# Patient Record
Sex: FEMALE | Race: BLACK OR AFRICAN AMERICAN
[De-identification: names, ages, dates, MRNs, and addresses within clinical notes are randomized per-mention and may not be internally consistent; named-entity substitution may affect disease eponyms.]

---

## 2017-03-08 NOTE — L&D FLOW SHEET
=================================================================

LD Flowsheet

=================================================================

Datetime Report Generated by CPN: 03/08/2017 22:00

   

   

=================================================================

Datetime: 03/08/2017 21:58

=================================================================

   

 NBP Sys/Dilma/Mean (mmHg):  139 (QS system process)

:  82 (QS system process)

:  105 (QS system process)

 Pulse:  82 (QS system process)

   

=================================================================

Datetime: 03/08/2017 21:43

=================================================================

   

 NBP Sys/Dilma/Mean (mmHg):  145 (QS system process)

:  71 (QS system process)

:  102 (QS system process)

 Pulse:  93 (QS system process)

   

=================================================================

Datetime: 03/08/2017 21:28

=================================================================

   

 NBP Sys/Dilma/Mean (mmHg):  141 (QS system process)

:  85 (QS system process)

:  107 (QS system process)

 Pulse:  80 (QS system process)

   

=================================================================

Datetime: 03/08/2017 21:14

=================================================================

   

Stage of Pregnancy:  Recovery (Betina Reece, RN)

   

=================================================================

Datetime: 03/08/2017 20:58

=================================================================

   

Stage of Pregnancy:  Recovery (Betina Reece, RN)

 NBP Sys/Dilma/Mean (mmHg):  131 (QS system process)

:  78 (QS system process)

:  102 (QS system process)

 Pulse:  87 (QS system process)

 Respirations:  18 (Betina Newell RN)

 Temperature (F):  98.7 (Betina Newell RN)

 Temperature (C):  37.1 (QS system process)

 Temperature Route:  Oral (Betina Newell RN)

 Pain Scale:  0 (Betina Newell RN)

   

=================================================================

Datetime: 03/08/2017 20:54

=================================================================

   

 Patient Care Comments:  del viable male (Betina Newell, YADIRA)

   

=================================================================

Datetime: 03/08/2017 20:50

=================================================================

   

 I/O Interventions:  Cot[place] Discontinued (Betina Newell RN)

 Patient Care Comments:  900 ml (Betina Newell RN)

   

=================================================================

Datetime: 03/08/2017 20:49

=================================================================

   

 Dilatation (cm):  10.0 (Betina Reece, RN)

   

=================================================================

Datetime: 03/08/2017 20:48

=================================================================

   

 NBP Sys/Dilma/Mean (mmHg):  138 (QS system process)

:  87 (QS system process)

:  105 (QS system process)

 Pulse:  96 (QS system process)

 LaborFlag:  Labor (QS system process)

   

=================================================================

Datetime: 03/08/2017 20:46

=================================================================

   

 Patient Position/Activity:  Tailors (Alba Lattibeaudeir, RN)

   

=================================================================

Datetime: 03/08/2017 20:45

=================================================================

   

 Monitor Mode:  External (Betina Newell RN)

 Frequency (min):  2-3 (Betina Newell RN)

 Quality:  Strong (Betina Newell RN)

 Duration (sec):  60-90 (Betina Newell RN)

 Pattern:  Normal: <= 5 Contractions in 10 Minutes (Betina Newell RN)

 Resting Tone (Palpate):  Relaxed (Betina Newell RN)

 Monitor Mode:  External US (Betina Newell RN)

 FHR Baseline Rate :  175 (Betina Newell RN)

 FHR Baseline Changes:  Tachycardia (Betina Newell RN)

 Variability:  Moderate 6-25 bpm (Betina Neewll RN)

 Accelerations:  None (Betina Newell RN)

 Decelerations:  Late (Betina Newell RN)

 Patient Care Comments:  Pt sitting up facing head of bed (Alba Gonzales RN)

 Communication:  RN Reviewed Strip; Provider at Bedside (Betina Newell RN)

   

=================================================================

Datetime: 03/08/2017 20:41

=================================================================

   

 Patient Care Comments:  pressure bag applied to LR (Alba Gonzales RN)

   

=================================================================

Datetime: 03/08/2017 20:36

=================================================================

   

 Actions for Fetal Decelerations:  Hands and Knees (Alba

   Lattibeaudeir, RN)

   

=================================================================

Datetime: 03/08/2017 20:34

=================================================================

   

 Contraction Comments:  Trial push to reduce cervix. (Betina Newell RN)

 IV/Blood Work:  IV Bolus Started (Betina Newell RN)

 Oxygen Amount :  10 (Alba Lattibeaudeir, RN)

 Oxygen Method:  Non-Rebreather (Alba Lattibeaudeir, RN)

   

=================================================================

Datetime: 03/08/2017 20:32

=================================================================

   

 Stage 2 Comments:  Dr. Wagner at . (Alba Lattibeaudeir, RN)

   

=================================================================

Datetime: 03/08/2017 20:30

=================================================================

   

 Monitor Mode:  External US (Betina Reece, RN)

 FHR Baseline Rate :  175 (Betina Reece, RN)

 FHR Baseline Changes:  Tachycardia (Betina Reece, RN)

 Variability:  Minimal - Undetectable to <=5 bpm (Betina Reece, RN)

 Accelerations:  15X15 (Betina Reece, RN)

 Decelerations:  Early; Variable (Betina Reece, RN)

 Comments:  variable down to 100's; 2 lates (Betina Reece, RN)

   

=================================================================

Datetime: 03/08/2017 20:23

=================================================================

   

 Dilatation (cm):  9.5 (Betina Reece, RN)

 Station:  1 (Betina Newell, RN)

 Exam by:  THAD Paulson (Betina Reece, RN)

 Vaginal Bleeding:  None (Betian Newell, RN)

 Cervix, Consistency:  Soft (Betina Reece, RN)

 Cervix, Position:  Anterior (Betina Reece, RN)

   

=================================================================

Datetime: 03/08/2017 20:20

=================================================================

   

 Patient Care Comments:  O2 d/c'd (Betina Newell, RN)

   

=================================================================

Datetime: 03/08/2017 20:18

=================================================================

   

 NBP Sys/Dilma/Mean (mmHg):  110 (QS system process)

:  66 (QS system process)

:  83 (QS system process)

 Pulse:  91 (QS system process)

 LaborFlag:  Labor (QS system process)

   

=================================================================

Datetime: 03/08/2017 20:15

=================================================================

   

 Monitor Mode:  External (Betina Reece, RN)

 Frequency (min):  1-4 (Betina Reece, RN)

 Frequency (min):  2-5 (Betina Reece, RN)

 Quality:  Moderate to Strong (Betina Reece, RN)

 Duration (sec):   (Betina Reece, RN)

 Duration (sec):  70-90 (Betina Reece, RN)

 Pattern:  Normal: <= 5 Contractions in 10 Minutes (Betina Reece, RN)

 Resting Tone (Palpate):  Relaxed (Betina Reece, RN)

 Monitor Mode:  External US (Betina Reece, RN)

 FHR Baseline Rate :  175 (Betina Reece, RN)

 FHR Baseline Rate :  170 (Betina Reece, RN)

 FHR Baseline Changes:  Tachycardia (Betina Reece, RN)

 Variability:  Minimal - Undetectable to <=5 bpm (Betina Reece, RN)

 Accelerations:  None (Betina Reece, RN)

 Decelerations:  Early; Late (Betina Reece, RN)

 Decelerations:  Early (Betina Reece, RN)

   

=================================================================

Datetime: 03/08/2017 20:04

=================================================================

   

 NBP Sys/Dilma/Mean (mmHg):  115 (QS system process)

:  70 (QS system process)

:  87 (QS system process)

 Pulse:  85 (QS system process)

 LaborFlag:  Labor (QS system process)

   

=================================================================

Datetime: 03/08/2017 20:00

=================================================================

   

 Monitor Mode:  External (Betina Newell RN)

 Frequency (min):  2-5 (Betina Newell RN)

 Quality:  Moderate to Strong (Betina Newell RN)

 Duration (sec):   (Betina Newell RN)

 Pattern:  Normal: <= 5 Contractions in 10 Minutes (Betina Newell RN)

 Resting Tone (Palpate):  Relaxed (Betina Newell RN)

 Monitor Mode:  External US (Betina Newell RN)

 FHR Baseline Rate :  170 (Betina Newell RN)

 FHR Baseline Changes:  Tachycardia (Betina Newell RN)

 Variability:  Minimal - Undetectable to <=5 bpm (Betina Newell RN)

 Accelerations:  None (Betina Newell RN)

 Decelerations:  Variable (Betina Newell RN)

## 2017-03-08 NOTE — L&D FLOW SHEET
=================================================================

LD Flowsheet

=================================================================

Datetime Report Generated by CPN: 03/08/2017 20:00

   

   

=================================================================

Datetime: 03/08/2017 19:54

=================================================================

   

 Dilatation (cm):  7.0 (Betina Newell RN)

 Effacement (%):  80 (Betina Newell RN)

 Station:  0 (Betina Newell RN)

 Exam by:  THAD Paulson (Betina Newell RN)

 Vaginal Bleeding:  None (Betina Newell RN)

 Cervix, Consistency:  Soft (Betina Newell RN)

 Cervix, Position:  Midposition (Betina Newell RN)

 Membrane Comments:  Forebag ruptured-clear fluid (Betina Newell RN)

 Communication:  Provider at Bedside (Betina Newell RN)

   

=================================================================

Datetime: 03/08/2017 19:48

=================================================================

   

 NBP Sys/Dilma/Mean (mmHg):  129 (QS system process)

:  83 (QS system process)

:  99 (QS system process)

 Pulse:  108 (QS system process)

 LaborFlag:  Labor (QS system process)

   

=================================================================

Datetime: 03/08/2017 19:47

=================================================================

   

 Antibiotics:  Penicillin IV (Units) @ 2,500,000 (Betina Newell RN)

 Patient Care Comments:  O2 applied per face rebreather mask (Betina Newell RN)

   

=================================================================

Datetime: 03/08/2017 19:34

=================================================================

   

 NBP Sys/Dilma/Mean (mmHg):  124 (QS system process)

:  79 (QS system process)

:  96 (QS system process)

 Pulse:  102 (QS system process)

 LaborFlag:  Labor (QS system process)

   

=================================================================

Datetime: 03/08/2017 19:30

=================================================================

   

 Level of Consciousness:  Fully Conscious (Betina Reece, RN)

 DTR's/Clonus:  DTRs 1+ (Betina Reece, RN)

 Headache:  Denies (Betina Reece, RN)

   

=================================================================

Datetime: 03/08/2017 19:18

=================================================================

   

 NBP Sys/Dilma/Mean (mmHg):  117 (QS system process)

:  80 (QS system process)

:  94 (QS system process)

 Pulse:  107 (QS system process)

 LaborFlag:  Labor (QS system process)

   

=================================================================

Datetime: 03/08/2017 19:15

=================================================================

   

Stage of Pregnancy:  Labor (Elaine Evie Roulund, RN)

 Respirations:  18 (Elaine Hernandez RN)

 Monitor Mode:  External (Elaine Hernandez RN)

 Frequency (min):  1.5-4 (Elaine Hernandez RN)

 Quality:  Moderate (Elaine Hernandez RN)

 Duration (sec):   (Elaine Hernandez RN)

 Duration (sec):   (Elaine Hernandez RN)

 Resting Tone (Palpate):  Relaxed (Elaine Hernandez RN)

 Monitor Mode:  External US (Elaine Hernandez RN)

 FHR Baseline Rate :  170 (Elaine Hernandze RN)

 FHR Baseline Changes:  Tachycardia (Elaine Hernandez RN)

 FHR Baseline Changes:  No Baseline Change (Elaine Hernandez RN)

 Variability:  Minimal - Undetectable to <=5 bpm (Elaine Hernandez RN)

 Accelerations:  None (Elaine Hernandez RN)

 Decelerations:  None (Elaine Hernandez RN)

 Pain Relief Measures:  Comfort Measures (Elaine Hernandez RN)

 Pain Coping:  Talking Through Contractions (Elaine Hernandez RN)

 IV/Blood Work:  IV Infusing per Order (Elaine Hernandez RN)

 Patient Position/Activity:  Left Tilt; Tailors (Elaine Hernandez,

   RN)

 Comfort Measures:  Family Support (Elaine Hernandez, YADIRA)

 Communication:  RN at Bedside; RN Reviewed Strip (Elaine Hernandez RN)

 LaborFlag:  Labor (QS system process)

   

=================================================================

Datetime: 03/08/2017 19:04

=================================================================

   

 NBP Sys/Dilma/Mean (mmHg):  125 (QS system process)

:  89 (QS system process)

:  103 (QS system process)

 Pulse:  100 (QS system process)

 LaborFlag:  Labor (QS system process)

   

=================================================================

Datetime: 03/08/2017 19:00

=================================================================

   

Stage of Pregnancy:  Labor (Elaine Hernandez RN)

 Respirations:  18 (Elaine Hernandez RN)

 Monitor Mode:  External (Elaine Hernandez RN)

 Monitor Interventions for UA:  [place] Adjusted (Elaine Hernandez RN)

 Frequency (min):  2-3 (Elaine Hernandez RN)

 Quality:  Moderate (Elaine Hernandez RN)

 Duration (sec):   (Elaine Hernandez RN)

 Resting Tone (Palpate):  Relaxed (Elaine Hernandez RN)

 Monitor Mode:  External US (Elaine Hernandez RN)

 FHR Baseline Rate :  170 (Elaine Hernandez RN)

 FHR Baseline Changes:  Tachycardia (Elaine Hernandez RN)

 Variability:  Minimal - Undetectable to <=5 bpm (Elaine Hernandez RN)

 Accelerations:  None (Elaine Hernandez RN)

 Decelerations:  None (Elaine Hernandez RN)

 Pain Scale:  0 (Elaine Hernandez RN)

 Pain Presence:  None/Denies (Elaine Hernandez RN)

 Pain Type:  N/A (Elaine Hernandez RN)

 Pain Relief Measures:  Comfort Measures (Elaine Hernandez RN)

 Pain Coping:  Talking Through Contractions (Elaine Hernandez RN)

 IV/Blood Work:  IV Infusing per Order (Elaine Hernandez RN)

 Patient Position/Activity:  Left Tilt; Tailors (Elaine Hernandez RN)

 Comfort Measures:  Family Support (Elaine Hernandez RN)

 Communication:  RN at Bedside; RN Reviewed Strip (Elaine Hernandez RN)

 LaborFlag:  Labor (QS system process)

   

=================================================================

Datetime: 03/08/2017 18:48

=================================================================

   

 NBP Sys/Dilma/Mean (mmHg):  118 (QS system process)

:  80 (QS system process)

:  95 (QS system process)

 Pulse:  87 (QS system process)

 LaborFlag:  Labor (QS system process)

   

=================================================================

Datetime: 03/08/2017 18:47

=================================================================

   

Stage of Pregnancy:  Labor (Elaine Hernandez RN)

 Antibiotics:  Gentamicin IV (mg) @ 120 (Elaine Hernandez RN)

 Communication:  RN at Bedside (Elaine Hernandez RN)

   

=================================================================

Datetime: 03/08/2017 18:45

=================================================================

   

Stage of Pregnancy:  Labor (Elaine Hernandez RN)

 Respirations:  18 (Elaine Hernandez RN)

 Monitor Mode:  External (Elaine Hernandez RN)

 Frequency (min):  2-3 (Elaine Hernandez RN)

 Quality:  Moderate (Elaine Hernandez RN)

 Duration (sec):   (Elaine Hernandez RN)

 Resting Tone (Palpate):  Relaxed (Elaine Hernandez RN)

 Monitor Mode:  External US (Elaine Hernandez RN)

 FHR Baseline Rate :  170 (Elaine Hernandez RN)

 FHR Baseline Changes:  Tachycardia (Elaine Hernandez RN)

 Variability:  Minimal - Undetectable to <=5 bpm (Elaine Hernandez RN)

 Accelerations:  None (Elaine Hernandez RN)

 Decelerations:  None (Elaine Hernandez RN)

 Pain Relief Measures:  Comfort Measures (Elaine Hernandez RN)

 Pain Coping:  Talking Through Contractions (Elaine Hernandez RN)

 Patient Position/Activity:  Left Tilt; Tailors (Elaine Hernandez,

   RN)

 Comfort Measures:  Family Support (Elaine Hernandez RN)

 Communication:  RN at Bedside; RN Reviewed Strip (Elaine Hernandez RN)

 LaborFlag:  Labor (QS system process)

   

=================================================================

Datetime: 03/08/2017 18:34

=================================================================

   

 NBP Sys/Dilma/Mean (mmHg):  129 (QS system process)

:  76 (QS system process)

:  98 (QS system process)

 Pulse:  85 (QS system process)

 LaborFlag:  Labor (QS system process)

   

=================================================================

Datetime: 03/08/2017 18:30

=================================================================

   

Stage of Pregnancy:  Labor (Elaine Hernandez RN)

 Respirations:  18 (Elaine Hernandez RN)

 Monitor Mode:  External (Elaine Hernandez RN)

 Frequency (min):  2-3 (Elaine Hernandez RN)

 Quality:  Moderate (Elaine Hernandez RN)

 Duration (sec):   (Elaine Hernandez RN)

 Resting Tone (Palpate):  Relaxed (Elaine Hernandez RN)

 Monitor Mode:  External US (Elaine Hernandez RN)

 FHR Baseline Rate :  170 (Elaine Hernandez RN)

 FHR Baseline Changes:  Tachycardia (Elaine Hernandez RN)

 Variability:  Minimal - Undetectable to <=5 bpm (Elaine Hernandez RN)

 Accelerations:  None (Elaine Hernandez RN)

 Decelerations:  None (Elaine Hernandez RN)

 Pain Scale:  0 (Elaine Hernandez RN)

 Pain Presence:  None/Denies (Elaine Hernandez RN)

 Pain Type:  N/A (Elaine Hernandez RN)

 Pain Location:  Abdomen (Elaine Hernandez RN)

 Pain Relief Measures:  Comfort Measures (Elaine Hernandez RN)

 Pain Coping:  Talking Through Contractions (Elaine Hernandez RN)

 Patient Position/Activity:  Left Tilt; Tailors (Elaine Hernandez,

   RN)

 Comfort Measures:  Family Support (Elaine Hernandez RN)

 Communication:  RN at Bedside; RN Reviewed Strip (Elaine Hernandez RN)

 LaborFlag:  Labor (QS system process)

   

=================================================================

Datetime: 03/08/2017 18:20

=================================================================

   

Stage of Pregnancy:  Labor (Elaine Hernandez RN)

 Provider Reviewed Strip:  Yes (Elaine Hernandez RN)

 Communication:  RN at Bedside; RN Reviewed Strip; Provider at Bedside;

   Provider Orders Received (Elaine Hernnadez RN)

   

=================================================================

Datetime: 03/08/2017 18:18

=================================================================

   

 NBP Sys/Dilma/Mean (mmHg):  132 (QS system process)

:  82 (QS system process)

:  100 (QS system process)

 Pulse:  97 (QS system process)

 LaborFlag:  Labor (QS system process)

   

=================================================================

Datetime: 03/08/2017 18:15

=================================================================

   

Stage of Pregnancy:  Labor (Elaine Hernandez RN)

 Respirations:  18 (Elaine Hernandez RN)

 Monitor Mode:  External (Elaine Hernandez RN)

 Frequency (min):  2-4 (Elaine Hernandez RN)

 Quality:  Moderate (Elaine Hernandez RN)

 Duration (sec):  60-80 (Elaine Hernandez RN)

 Resting Tone (Palpate):  Relaxed (Elaine Hernandez RN)

 Monitor Mode:  External US (Elaine Hernandez RN)

 FHR Baseline Rate :  170 (Elaine Hernandez RN)

 FHR Baseline Changes:  Tachycardia (Elaine Hernandez RN)

 Variability:  Minimal - Undetectable to <=5 bpm (Elaine Hernandez,

   YADIRA)

 Accelerations:  None (Elaine Hernandez, YADIRA)

 Decelerations:  None (Elaine Hernandez RN)

 Pain Relief Measures:  Comfort Measures (Elanie Hernandez RN)

 Pain Coping:  Talking Through Contractions (Elaine Hernandez, RN)

 IV/Blood Work:  New IV Bag Hung (Elaine Hernandez, RN)

 Patient Position/Activity:  Left Tilt; Tailors (Elaine Hernandez,

   RN)

 Comfort Measures:  Family Support (Elaine Hernandez RN)

 Communication:  RN at Bedside; RN Reviewed Strip (Elaine Hernandez RN)

 LaborFlag:  Labor (QS system process)

   

=================================================================

Datetime: 03/08/2017 18:12

=================================================================

   

 NBP Sys/Dilma/Mean (mmHg):  128 (QS system process)

:  85 (QS system process)

:  102 (QS system process)

 Pulse:  88 (QS system process)

 LaborFlag:  Labor (QS system process)

   

=================================================================

Datetime: 03/08/2017 18:09

=================================================================

   

 NBP Sys/Dilma/Mean (mmHg):  132 (QS system process)

:  87 (QS system process)

:  101 (QS system process)

 Pulse:  97 (QS system process)

 LaborFlag:  Labor (QS system process)

   

=================================================================

Datetime: 03/08/2017 18:02

=================================================================

   

Stage of Pregnancy:  Labor (Elaine Hernandez RN)

 NBP Sys/Dilma/Mean (mmHg):  134 (QS system process)

:  82 (QS system process)

:  100 (QS system process)

 Pulse:  95 (QS system process)

 Temperature (F):  98.4 (Elaine Hernandez RN)

 Temperature (C):  36.9 (QS system process)

 Temperature Route:  Axillary (Elaine Hernandez RN)

 LaborFlag:  Labor (QS system process)

   

=================================================================

Datetime: 03/08/2017 18:00

=================================================================

   

Stage of Pregnancy:  Labor (Elaine Hernandez RN)

 Respirations:  18 (Elaine Hernandez RN)

 Monitor Mode:  External (Elaine Hernandez RN)

 Frequency (min):  2-4 (Elaine Hernandez RN)

 Quality:  Moderate (Elaine Hernandez RN)

 Duration (sec):  60-80 (Elaine Hernandez RN)

 Resting Tone (Palpate):  Relaxed (Elaine Hernandez RN)

 Monitor Mode:  External US (Elaine Hernandez RN)

 FHR Baseline Rate :  165 (Elaine Hernandez RN)

 FHR Baseline Changes:  Tachycardia (Elaine Hernandez RN)

 Variability:  Minimal - Undetectable to <=5 bpm (Elaine Hernandez RN)

 Accelerations:  None (Elaine Hernandez RN)

 Decelerations:  None (Elaine Hernandez, RN)

 Pain Scale:  0 (Elaine Hernandez, RN)

 Pain Presence:  None/Denies (Elaine Hernandez, RN)

 Pain Type:  N/A (Elaine Hernandez, RN)

 Pain Relief Measures:  Comfort Measures (Elaine Hernandez RN)

 Pain Coping:  Talking Through Contractions (Elaine Hernandez, RN)

 IV/Blood Work:  IV Infusing per Order (Elaine Hernandez, RN)

 Patient Position/Activity:  Left Tilt; Low Fowlers (Elaine Hernandez, RN)

 Comfort Measures:  Family Support (Elaine Hernandez, YADIRA)

 Communication:  RN at Bedside; RN Reviewed Strip (Elaine Hernanedz, RN)

 LaborFlag:  Labor (QS system process)

## 2017-03-08 NOTE — L&D FLOW SHEET
=================================================================

LD Flowsheet

=================================================================

Datetime Report Generated by CPN: 03/08/2017 18:00

   

   

=================================================================

Datetime: 03/08/2017 17:58

=================================================================

   

 NBP Sys/Dilma/Mean (mmHg):  133 (QS system process)

:  75 (QS system process)

:  98 (QS system process)

 Pulse:  82 (QS system process)

 LaborFlag:  Labor (QS system process)

   

=================================================================

Datetime: 03/08/2017 17:53

=================================================================

   

 NBP Sys/Dilma/Mean (mmHg):  131 (QS system process)

:  77 (QS system process)

:  99 (QS system process)

 Pulse:  78 (QS system process)

 LaborFlag:  Labor (QS system process)

   

=================================================================

Datetime: 03/08/2017 17:52

=================================================================

   

Stage of Pregnancy:  Labor (Elaine Hernandez RN)

 Dilatation (cm):  5.5 (Elaine Hernandez RN)

 Effacement (%):  70 (Elaine Hernandez RN)

 Station:  -1 (Elaine Hernandez RN)

 Exam by:  DR LEONE (Elaine Hernandez RN)

 Vaginal Bleeding:  None (Elaine Hernandez RN)

 Cervix, Consistency:  Soft (Elaine Hernandez RN)

 Cervix, Position:  Midposition (Elaine Hernandez RN)

 Procedures:  Sterile Vag Exam (Elaine Hernandez RN)

 Provider Reviewed Strip:  Yes (Elaine Hernandez RN)

 Communication:  RN at Bedside; RN Reviewed Strip; Provider at Bedside

   (Elaine Hernandez RN)

   

=================================================================

Datetime: 03/08/2017 17:49

=================================================================

   

 NBP Sys/Dilma/Mean (mmHg):  123 (QS system process)

:  79 (QS system process)

:  97 (QS system process)

 Pulse:  90 (QS system process)

 LaborFlag:  Labor (QS system process)

   

=================================================================

Datetime: 03/08/2017 17:42

=================================================================

   

 NBP Sys/Dilma/Mean (mmHg):  122 (QS system process)

:  76 (QS system process)

:  95 (QS system process)

 Pulse:  84 (QS system process)

 LaborFlag:  Labor (QS system process)

   

=================================================================

Datetime: 03/08/2017 17:34

=================================================================

   

 I/O Interventions:  Cotton Cath Inserted (Elaine Evie Roulund, RN)

   

=================================================================

Datetime: 03/08/2017 17:31

=================================================================

   

 Epidural Procedure Other:  Pump Started (Elaine Hernandez, RN)

 Anesthesia Level Check:  T9 (Elaine Hernandez, RN)

   

=================================================================

Datetime: 03/08/2017 17:29

=================================================================

   

 Pulse:  90 (QS system process)

 SpO2 (%):  88 (QS system process)

 LaborFlag:  Labor (QS system process)

   

=================================================================

Datetime: 03/08/2017 17:28

=================================================================

   

 NBP Sys/Dilma/Mean (mmHg):  124 (QS system process)

:  82 (QS system process)

:  95 (QS system process)

 Pulse:  98 (QS system process)

 LaborFlag:  Labor (QS system process)

   

=================================================================

Datetime: 03/08/2017 17:27

=================================================================

   

 NBP Sys/Dilma/Mean (mmHg):  124 (QS system process)

:  83 (QS system process)

:  98 (QS system process)

 Pulse:  85 (QS system process)

 LaborFlag:  Labor (QS system process)

   

=================================================================

Datetime: 03/08/2017 17:26

=================================================================

   

 NBP Sys/Dilma/Mean (mmHg):  118 (QS system process)

:  76 (QS system process)

:  93 (QS system process)

 Pulse:  82 (QS system process)

 IV/Blood Work:  IV Infusing per Order (Elaine Hernandez RN)

 Epidural Procedure:  Loading Dose (Elaine Hernandez RN)

 LaborFlag:  Labor (QS system process)

   

=================================================================

Datetime: 03/08/2017 17:25

=================================================================

   

 NBP Sys/Dilma/Mean (mmHg):  125 (QS system process)

:  76 (QS system process)

:  96 (QS system process)

 Pulse:  83 (QS system process)

 Anesthesia Plans:  Epidural (Elaine Hernandez RN)

 Epidural Procedure:  Cath Placed; Test Dose (Elaine Hernandez RN)

 LaborFlag:  Labor (QS system process)

   

=================================================================

Datetime: 03/08/2017 17:24

=================================================================

   

 Pulse:  100 (QS system process)

 SpO2 (%):  99 (QS system process)

 LaborFlag:  Labor (QS system process)

   

=================================================================

Datetime: 03/08/2017 17:19

=================================================================

   

Stage of Pregnancy:  Labor (Elaine Hernandez RN)

 Pulse:  90 (QS system process)

 SpO2 (%):  100 (QS system process)

 Procedure Type:  EPIDURAL (Elaine Hernandez RN)

 Procedure Verify:  Correct Patient Identity; Correct Side and Site are

   Marked; Accurate Procedure Consent Form; Agreement on Procedure to

   be Done; Correct Patient Position; Relevant Images and Results are

   Properly Labeled and Displayed (Elaine Hernandez RN)

 Anesthesia Plans:  Epidural (Elaine Evie Roulund, RN)

 Epidural Positioning:  Sitting (Elaine Hernandez RN)

 Anesthesia Comments:  DR XIONG @ BS (Elaine Hernandez RN)

 Communication:  RN at Bedside; RN Reviewed Strip; Provider at Bedside

   (Elaine Hernandez RN)

 LaborFlag:  Labor (QS system process)

   

=================================================================

Datetime: 03/08/2017 17:05

=================================================================

   

 NBP Sys/Dilma/Mean (mmHg):  131 (QS system process)

:  88 (QS system process)

:  105 (QS system process)

 Pulse:  96 (QS system process)

 LaborFlag:  Labor (QS system process)

   

=================================================================

Datetime: 03/08/2017 17:02

=================================================================

   

 I/O Interventions:  Popsicle (Elaine Hernandez RN)

   

=================================================================

Datetime: 03/08/2017 17:00

=================================================================

   

 Monitor Mode:  External; Palpation (Shanon Amadeo, RNC)

 Frequency (min):  3-4 (Shanon Amadeo, RNC)

 Quality:  Mild/Moderate (Shanon Amadeo, RNC)

 Duration (sec):   (Shanon Amadeo, RNC)

 Duration Criteria:  Less than Two 120 Second Contractions (Shanon

   Amadeo, RNC)

 Pattern:  Normal: <= 5 Contractions in 10 Minutes (Shanon Amadeo,

   RNC)

 Resting Tone (Palpate):  Relaxed (Shanon Amadeo, RNC)

 Monitor Mode:  External US (Shanon Amadeo, RNC)

 FHR Baseline Rate :  170 (Shanon Amadeo, RNC)

 Variability:  Moderate 6-25 bpm (Shanon Amadeo, RNC)

 Accelerations:  15X15 (Shanon Amadeo, RNC)

 Decelerations:  None (Shanon Amadeo, RNC)

   

=================================================================

Datetime: 03/08/2017 16:59

=================================================================

   

Stage of Pregnancy:  Labor (Elaine Hernandez RN)

 Monitor Interventions for UA:  Slovan Adjusted (Elaine Hernandez RN)

 Monitor Interventions for FHR:  Ultrasound Adjusted (Elaine Hernandez RN)

 Communication:  RN at Bedside; RN Reviewed Strip (Elaine Hernandez RN)

   

=================================================================

Datetime: 03/08/2017 16:56

=================================================================

   

Stage of Pregnancy:  Labor (Elaine Hernandez RN)

 Procedure Type:  EPIDURAL (Elaine Hernandez RN)

 Procedure Verify:  Correct Patient Identity; Agreement on Procedure to

   be Done (Elaine Hernandez RN)

 Anesthesia Plans:  Epidural (Elaine Hernandez RN)

 Anesthesia Comments:  DR XIONG NOTIFIED OF EPIDURAL REQUEST (Elaine Hernandez RN)

 Communication:  RN at Bedside (Elaine Hernandez RN)

   

=================================================================

Datetime: 03/08/2017 16:40

=================================================================

   

Stage of Pregnancy:  Labor (Elaine Hernandez RN)

 Monitor Interventions for UA:  Slovan Adjusted (Elaine Hernandez RN)

 Monitor Interventions for FHR:  Ultrasound Adjusted (Elaine Hernandez, RN)

 Pain Scale:  4 (Elaine Hernandez RN)

 Pain Presence:  Intermittent (Elaine Hernandez RN)

 Pain Type:  Contraction (Elaine Hernandez RN)

 Pain Location:  Abdomen (Elaine Hernandez RN)

 Pain Relief Measures:  Comfort Measures (Elaine Hernandez RN)

 Pain Coping:  Breathing Through Contractions; Requesting Pain

   Medication or Epidural (Elaine Hernandez, RN)

 IV/Blood Work:  New IV Bag Hung (Elaine Hernandez, RN)

 Comfort Measures:  Family Support (Elaine Hernandez, RN)

 I/O Interventions:  Up to BR (Elaine Hernandez, RN)

 Procedure Verify:  Correct Patient Identity; Agreement on Procedure to

   be Done; Relevant Images and Results are Properly Labeled and

   Displayed (Elaine Hernandez, RN)

 Anesthesia Plans:  Epidural (Elaine Hernandez, RN)

 Anesthesia Comments:  EPIDURAL (Elaine Hernandez, RN)

 Communication:  RN at Bedside; RN Reviewed Strip (Elaine Hernandez RN)

 LaborFlag:  Labor (QS system process)

   

=================================================================

Datetime: 03/08/2017 16:30

=================================================================

   

 Monitor Mode:  External; Palpation (THAD Ch)

 Frequency (min):  2.5-3 (THAD Ch)

 Quality:  Mild/Moderate (THAD Ch)

 Duration (sec):  60-90 (THAD Ch)

 Duration Criteria:  Less than Two 120 Second Contractions (THAD Ch)

 Pattern:  Normal: <= 5 Contractions in 10 Minutes (THAD Ch)

 Resting Tone (Palpate):  Relaxed (THAD Ch)

 Monitor Mode:  External US (THAD Ch)

 FHR Baseline Rate :  165 (THAD Ch)

 Variability:  Moderate 6-25 bpm (THAD Ch)

 Accelerations:  Prolonged (THAD Ch)

 Decelerations:  None (THAD Ch)

   

=================================================================

Datetime: 03/08/2017 16:25

=================================================================

   

 NBP Sys/Dilma/Mean (mmHg):  121 (QS system process)

:  83 (QS system process)

:  98 (QS system process)

 Pulse:  83 (QS system process)

   

=================================================================

Datetime: 03/08/2017 16:18

=================================================================

   

 Pain Scale:  4 (THAD Ch)

 Pain Presence:  Intermittent (THAD Ch)

 Pain Type:  Cramping; Contraction (THAD Ch)

 Pain Location:  Abdomen; Back (THAD Ch)

 Vaginal Bleeding:  None (THAD Ch)

 Level of Consciousness:  Fully Conscious (THAD Ch)

 DTR's/Clonus:  DTRs 2+; No Clonus (THAD Ch)

 Headache:  Denies (THAD Ch)

 Breath Sounds, Left:  Clear and Equal (THAD Ch)

 Breath Sounds, Right:  Clear and Equal (THAD Ch)

 Nausea/Vomiting:  Denies (THAD Ch)

 RUQ Epigastric Pain:  Denies (THAD Ch)

 Instructional Method:  Verbal; Patient Instructed; Verbalized

   Understanding (THAD Ch)

 Plan of Care:  Plan of Care Discussed; Vaginal Delivery; Labor

   (THAD Ch)

 Unit Routine:  [place] to Room; Call Rosenbaum; Bed; Unit Personnel; Fetal

   Monitoring; IV Pumps; Bathroom Privileges; Medications (THAD Ch)

 Labor/Induction:  Labor Stages; Augmentation; Antibiotic Use; Fetal

   Interventions; Activity (THAD Ch)

 Pain Management:  IV Narcotics; Epidural; Pain Scale/Goals; Comfort

   Measures (THAD Ch)

 Medications:  Antibiotics; IV Narcotics (THAD Ch)

   

=================================================================

Datetime: 03/08/2017 16:11

=================================================================

   

 I/O Interventions:  Up to BR (THAD Ch)

   

=================================================================

Datetime: 03/08/2017 16:00

=================================================================

   

 Monitor Mode:  External; Palpation (THAD Ch)

 Frequency (min):  3-5 (THAD Ch)

 Quality:  Mild (THAD Ch)

 Duration (sec):  50-80 (THAD Ch)

 Duration Criteria:  Less than Two 120 Second Contractions (THAD Ch)

 Pattern:  Normal: <= 5 Contractions in 10 Minutes (THAD Ch)

 Resting Tone (Palpate):  Relaxed (THAD Ch)

 Monitor Mode:  External US (THAD Ch)

 FHR Baseline Rate :  155 (THAD Ch)

 Variability:  Moderate 6-25 bpm (THAD Ch)

 Accelerations:  15X15 (THAD Ch)

 Decelerations:  None (THAD Ch)

## 2017-03-08 NOTE — DELIVERY SUMMARY
=================================================================

Del Sum A-C

=================================================================

Datetime Report Generated by CPN: 2017 23:43

   

   

=================================================================

ADMISSION DATA

=================================================================

   

Chief Complaint:  Suspected Ruptured Membranes

Indication for Induction:  Not Applicable

Admission Impression:  Term, Intrauterine Pregnancy; Ruptured Membranes

Admission Impression Comments:  [facility]OM at 1400 today 17

Admit Provider Comments:  

   Late onset prenatal care-first OB visit 17 at [facility]

   States had sono at [facility] at [facility] w[facility]-did not know was pregnant

   Had transportation problems and could not apply for Medicaid

   Close interval pregnancies

   GBS positive-begin prophylaxis

   See prenatal records from [facility]

    with G1-proven for 6lbs 8 oz

      

   

=================================================================

DELIVERY PERSONNEL

=================================================================

   

Delivery Doctor::  Khushi Wagner MD

Labor and Delivery Nurse::  Betina Newell RN

Labor and Delivery Nurse::  Ada Palomino RN

Nursery Nurse::  Cherise Baez RN

Scrub Tech/CNA:  Marii Trujillo CNA

   

=================================================================

MATERNAL INFORMATION

=================================================================

   

Delivery Anesthesia:  Epidural

Medications After Delivery:  Pitocin Bolus-Please Comment; Pitocin Drip

   20 Units/1000ml NSS

Estimated  Blood Loss (ml):  200

Maternal Complications:  Chorioamnionitis; Other

Other Maternal Complications:  limited pnc

Provider Comments:  Pt developed fetal tachycardia while on gbs

   prophylaxis with PCN. Added gentamcyin. Pt had intermittent lates

   when 9 cm...responded to oxygen, ivf and repositioning. Progressed

   to complete and pushed to delivery of male infant wtih aptgars 9 and

   9. Head delivered OA and nuchal reduced. Shoulders and body

   delivered easily. No lacerations. Placenta sponta and intact.

   

=================================================================

LABOR SUMMARY

=================================================================

   

EDC:  2017 00:00

No. Babies in Womb:  1

 Attempted:  No

Labor Anesthesia:  Epidural

   

=================================================================

LABOR INFORMATION

=================================================================

   

Reason for Induction:  Not Applicable

Onset of Labor:  2017 14:00

Complete Dilatation:  2017 20:49

Oxytocin:  N/A

Group B Beta Strep:  Positive

Antibiotics # of Doses:  2

Antibiotics Time of Last Dose:  

Name of Antibiotic Given:  penicillin g _ gentamycin

Steroids Given:  None

Reason Steroids Not Administered:  Not Applicable

   

=================================================================

MEMBRANES

=================================================================

   

Membranes Rupture Method:  Spontaneous

Rupture of Membranes:  2017 14:00

Length of Rupture (hr):  6.90

Amniotic Fluid Color:  Clear

Amniotic Fluid Amount:  Moderate

Amniotic Fluid Odor:  None

   

=================================================================

STAGES OF LABOR

=================================================================

   

Stage 1 hr:  6

Stage 1 min:  49

Stage 2 hr:  0

Stage 2 min:  5

Stage 3 hr:  0

Stage 3 min:  4

Total Time in Labor hr:  6

Total Time in Labor min:  58

   

=================================================================

VAGINAL DELIVERY

=================================================================

   

Episiotomy:  None

Laceration Type:  None

Sponge Count Correct:  N/A

   

=================================================================

CSECTION DELIVERY

=================================================================

   

Primary Indication:  N/A

Secondary Indication:  N/A

CSection Incidence:  N/A

Labor:  N/A

Elective:  N/A

CSection Incision:  N/A

   

=================================================================

BABY A INFORMATION

=================================================================

   

Infant Delivery Date/Time:  2017 20:54

Method of Delivery:  Vaginal

Born in Route :  No

:  N/A

Forceps:  N/A

Vacuum Extraction:  N/A

Shoulder Dystocia :  No

   

=================================================================

PRESENTATION/POSITION BABY A

=================================================================

   

Presentation:  Cephalic

Cephalic Presentation:  Vertex

Vertex Position:  Left Occipital Anterior

Breech Presentation:  N/A

   

=================================================================

PLACENTA INFORMATION BABY A

=================================================================

   

Placenta Delivery Time :  2017 20:58

Placenta Method of Delivery:  Spontaneous

Placenta Status:  Delivered

   

=================================================================

APGAR SCORES BABY A

=================================================================

   

Heart Rate 1 min:  >100 bpm

Resp Effort 1 min:  Good Cry

Reflex Irritability 1 min:  Cough or Sneeze or Pulls Away

Muscle Tone 1 min:  Active Motion

Color 1 min:  Blue/Pale

Resuscitation Effort 1 min:  Tactile Stimulation

APGAR SCORE 1 MIN:  8

Heart Rate 5 min:  >100 bpm

Resp Effort 5 min:  Good Cry

Reflex Irritability 5 min:  Cough or Sneeze or Pulls Away

Muscle Tone 5 min:  Active Motion

Color 5 min:  Body Pink, Extremities Blue

Resuscitation Effort 5 min:  Tactile Stimulation

APGAR SCORE 5 MIN:  9

   

=================================================================

INFANT INFORMATION BABY A

=================================================================

   

Gestational Age at Delivery:  37.0

Gestational Status:  Early Term- 37- 38.6 Weeks

Infant Outcome :  Liveborn

Infant Condition :  Stable

Infant Sex:  Male

   

=================================================================

IDENTIFICATION BABY A

=================================================================

   

Infant Verification Date/Time:  2017 21:06

ID Band Number:  [id number]

Mother's Name Verified:  Yes

Infant Medical Record Number:  [id number]

RN Verifying Infant:  RENAE Gonzales RN

Additional Verifying Personnel:  LUIS Hylton RN

   

=================================================================

WEIGHT/LENGTH BABY A

=================================================================

   

Infant Birthweight (gm):  3660

Infant Weight (lb):  8

Infant Weight (oz):  1

Infant Length (in):  19.25

Infant Length (cm):  48.90

   

=================================================================

CORD INFORMATION BABY A

=================================================================

   

No. Cord Vessels:  3

Nuchal Cord :  Around Neck x1, Loose

Cord Blood Taken:  Yes-For Eval (Mom's Blood Type - or O+)

Infant Suction:  Mouth; Nose

   

=================================================================

ASSESSMENT BABY A

=================================================================

   

Infant Complications:  Extended Fetal Tachycardia

Physical Findings at Delivery:  Other

Physical Findings- Other:  very bruised face; see [facility] notes for further

   documentation

Infant Respirations:  Appears Normal

Skin to Skin:  Yes

Skin to Skin Time (min):  45

Neonatologist/ALS Called :  No

Infant Care By:  GRECIA Baez RN

   

=================================================================

BABY B INFORMATION

=================================================================

   

 :  N/A

   

=================================================================

SIGNATURES

=================================================================

   

Signature:  Electronically signed by Khushi Wagner MD ([email or web address]) on

   3/8/2017 at 21:06  with User ID: [email or web address]

:  I personally evaluated and examined the patient in conjunction with

   the MLP and agree with the assessment, treatment plan and

   disposition.

## 2017-03-08 NOTE — ADMISSION PHYSICAL
=================================================================



=================================================================

Datetime Report Generated by CPN: 2017 23:16

   

   

=================================================================

CURRENT ADMISSION

=================================================================

   

Chief Complaint:  Suspected Ruptured Membranes

Indication for Induction:  Not Applicable

Admit Impression- Other:  SROM at 1400 today //

Admit Plan:  Admit to Unit; Initiate Labor Protocol

   

=================================================================

ALLERGIES

=================================================================

   

Medication Allergies:  No

Medication Allergies:  No Known Allergies (2017)

Medication Allergies:  No Known Allergies (2016)

Latex:  No Latex Allergies

   

=================================================================

OBSTETRICAL HISTORY

=================================================================

   

EDC:  2017 00:00

:  2

Para:  1

Term:  1

:  0

SAB:  0

IAB:  0

Ectopic:  0

Livin

Cesareans:  0

VBACs:  0

Multiple Births:  0

Gestational Diabetes:  No

Rh Sensitization:  No

Incompetent Cervix:  No

RUPINDER:  No

Infertility:  No

ART Treatment:  No

Uterine Anomaly:  No

IUGR:  No

Hx Previous C/S:  No

Macrosomia:  No

Hx Loss/Stillborn:  No

PIH:  No

Hx  Death:  No

Placenta Previa/Abruption:  No

Depression/PP Depression:  No

PTL/PROM:  No

Post Partum Hemorrhage:  No

Obstetrical History Comments:  G1:, 5lb 6 ozs

   G2: current, no PNCz

   

=================================================================

***SEE PRENATAL RECORDS***

=================================================================

   

Alcohol:  No

Marijuana :  No

Cocaine:  No

Other Illicit Drugs:  No

Cigarettes:  Former Smoker. [id number]

   

=================================================================

MEDICAL HISTORY

=================================================================

   

Diabetes:  No

Blood Transfusion:  No

Pulmonary Disease (Asthma, TB):  No

Breast Disease:  No

Hypertension:  No

Gyn Surgery:  No

Heart Disease:  No

Hosp/Surgery:  No

Autoimmune Disorder:  No

Anesthetic Complications:  No

Kidney Disease:  No

Abnormal Pap Smear:  No

Neuro/Epilepsy:  No

Psychiatric Disorders:  No

Other Medical Diseases:  No

Hepatitis/Liver Disease:  No

Significant Family History:  No

Varicosities/Phlebitis:  No

Trauma/Violence :  No

Thyroid Dysfunction:  No

   

=================================================================

INFECTIOUS HISTORY

=================================================================

   

Gonorrhea:  No

Genital Herpes:  No

Chlamydia:  No

Tuberculosis:  No

Syphilis:  No

Hepatitis:  No

HIV/AIDS Exposure:  No

Rash or Viral Illness:  No

HPV:  No

   

=================================================================

PHYSICAL EXAM

=================================================================

   

General:  Normal

HEENT:  Normal

Neurologic:  Normal

Thyroid:  Deferred

Heart:  Normal

Lungs:  Normal

Breast:  Deferred

Back:  Normal

Abdomen:  Normal

Genitourinary Exam:  Normal

Extremities:  Normal

DTRs:  Normal

Pelvic Type:  Adequate

Physical Exam Comments:  Gravid

Vital Signs:  Reviewed; Within Normal Limits

   

=================================================================

VAGINAL EXAM

=================================================================

   

Dilatation:  3

Effacement:  70

Station:  -3

Contraction Comments:  3 mins apart

   

=================================================================

MEMBRANES

=================================================================

   

Membranes:  Ruptured

Amniotic Fluid Color:  Clear

   

=================================================================

FETUS A

=================================================================

   

EGA:  37.0

Monitoring:  External US

FHR- Baseline:  160

Variability:  Moderate 6-25bpm

Accelerations:  10X10

Decelerations:  None

Admit Comment:  

   Late onset prenatal care-first OB visit 17 at [facility] [place]

   States had sono at [facility] at 20 wks-did not know was pregnant

   Had transportation problems and could not apply for Medicaid

   Close interval pregnancies

   GBS positive-begin prophylaxis

   See prenatal records from [facility]

    with G1-proven for 6lbs 8 oz

      

   

=================================================================

PLANS FOR LABOR AND DELIVERY

=================================================================

   

Pain Management:  Epidural

Feeding Preference:  Breast

Benefit of Breast Feed Discussed:  Yes

Circumcision:  No

   

=================================================================

INFORMED CONSENT

=================================================================

   

Assignment:  Khushi Wagner MD

Signature:  Electronically signed by Kimmy Mendoza CNM on 3/8/2017 at

   15:52  with User ID: [email or web address]

:  Electronically signed by Kimmy Mendoza CNM on 3/8/2017 at 15:52  with

   User ID: [email or web address]

:  I personally evaluated and examined the patient in conjunction with

   the MLP and agree with the assessment, treatment plan and

   disposition.

## 2017-03-08 NOTE — L&D FLOW SHEET
=================================================================

LD Flowsheet

=================================================================

Datetime Report Generated by CPN: 03/08/2017 16:00

   

   

=================================================================

Datetime: 03/08/2017 15:55

=================================================================

   

   

=================================================================

Patient Care

=================================================================

   

 IV/Blood Work:  IV Started; IV Bolus Started (Shanon Amadeo, RNC)

   

=================================================================

Datetime: 03/08/2017 15:22

=================================================================

   

   

=================================================================

Vital Signs

=================================================================

   

 NBP Sys/Dilma/Mean (mmHg):  107 (QS system process)

:  75 (QS system process)

:  87 (QS system process)

 Pulse:  89 (QS system process)

## 2017-03-09 NOTE — L&D CARE PLAN
=================================================================

LD CARE PLANS

=================================================================

Datetime Report Generated by CPN: 2017 18:15

   

   

=================================================================

Datetime: 2017 15:28

=================================================================

   

   

=================================================================

Pain

=================================================================

   

 State:  Risk For (Caroline Valles RN)

 Related To:  Labor and Delivery Process; Disease Process; Treatment

   and Procedures; Post Partum (Caroline Valles RN)

 Goal(s):  Patients Pain will be Assessed and Managed; Patient will

   Verbalize Adequate Relief of Pain or the Ability to [place] with

   Current Pain (Caroline Valles RN)

 Interventions:  Assess Pain Severity on Scale of 0 (None) to 5

   (Severe); Assess Type, Location and Intensity of Pain Each Time

   Client Reports Discomfort and Notify Provider if Unusal Pain

   Develops; Encourage Proper Breathing and Relaxation Techniques;

   Offer Alternatives Such as Repositioning, Calm Environment,

   Massages, Diversional Activities, Ice Pack, Splinting, and

   Ambulation; Administer Analgesics as Ordered; Assist with Epidural

   Placement as Appropriate; Evaluate Therapeutic Effectiveness of

   Medication and Treatments (Caroline Valles RN)

 Outcome:  Patient will Report Absence or Relief of Pain Consistent

   with Established Pain Goal (Caroline Valles RN)

   Status:  Ongoing (Caroline Valles RN)

 Outcome:  Patient will have a Decrease in Signs and Symptoms of

   Discomfort (Caroline Valles RN)

   Status:  Ongoing (Caroline Valles RN)

 Outcome:  Pain will be Controlled During Procedures (Caroline Valles RN)

   Status:  Ongoing (Caroline Valles RN)

   

=================================================================

Anxiety

=================================================================

   

 State:  Risk For (Caroline Valles RN)

 Related To:  Labor and Delivery Process; Medical Interventions;

   Significant Life Event (Caroline Valles RN)

 Goal(s):  Patient will have Decreased Anxiety and be able to Function

   at Acceptable Levels (Caroline Valles RN)

 Interventions:  Assess Verbal and Nonverbal  Behavioral Indicators of

   Anxiety; Assist Patient to Identify and Verbalize Symptoms of

   Anxiety; Identify and Demonstrate Techniques to Control Anxiety;

   Assist Patient with Coping Mechanisms to Manage Anxiety; Provide

   Theraputic Touch for the Patient; Explain to Patient, Using a Calm

   Reassuring Approach and Nonmedical Terms, All Activities,

   Procedures, and Concerns; Instruct Patient and Family about Post

   Discharge Care, Limitations, Symptoms to Report and Resources

   Available (Caroline Valles RN)

 Outcome:  Patient will Identify, Verbalize and Demonstrate Techniques

   to Control Anxiety (Caroline Valles RN)

   Status:  Ongoing (Caroline Valles RN)

 Outcome:  Patient's Posture, Facial Expressions, Gestures and Activity

   Level will Reflect Decreased Anxiety (Caroline Valles RN)

   Status:  Ongoing (Caroline Valles RN)

 Outcome:  Patient will Verbalize a Sense of Control and/or Acceptance

   of the Situation (Caroline Valles RN)

   Status:  Ongoing (Caroline Valles RN)

 Outcome:  Patient will Identify and Utilize Support Person (Caroline Valles RN)

   Status:  Ongoing (Caroline Valles RN)

   

=================================================================

Knowledge Deficit

=================================================================

   

 State:  Risk For (Caroline Valles RN)

 Related To:  Labor and Delivery Process; Surgical Procedures;

   Treatment and Procedures; Impending Alterations in Family Dynamics;

   Feeding and Infant Care; Community Resources and Available Support

   Mechanisms (Caroline Valles RN)

 Goal(s):  Patient will Accurately Verbalize Understanding of Plan of

   Care and Treatment; Patient and Family will Accurately Verbalize

   Understanding of the Disease Process (Caroline Valles RN)

 Interventions:  Assess Motivation and Willingness of Patient/Family to

   Learn; Assess Preferred Learning Mode: One to One Instruction,

   Reading, Videos, Group Discussion or Demonstration; Assess Barriers

   to Learning: Pain, Emotional State, Language Barrier, Cognitive

   Impairment, Visual or Hearing Deficits; Assess Patient and Family

   Knowledge of Disease Process, Medications and Treatment; Discuss

   Therapy and/or Treatment Options, Describe Rationale Behind

   Management, Therapy and Treatment Recommendations; Instruct Patient

   and Family on Signs and Symptoms to Report; Instruct Patient and

   Family on Medication Effects and Side Effects; Provide Appropriate

   and Timely Education Using Multiple Techniques; Provide Patient and

   Family with Support Group Information and Resources; Give Clear and

   Thorough Explanations and Demonstrations (Caroline Valles RN)

 Outcome:  Patient and Family will Verbalize Understanding of

   Condition, Treatment and Signs and Symptoms to Report (Caroline Valles RN)

   Status:  Ongoing (Caroline Valles RN)

 Outcome:  Patient will Identify Perceived Learning Needs and Express

   Motivation to Learn (Caroline Valles RN)

   Status:  Ongoing (Caroline Valles RN)

 Outcome:  Patient will Verbalize Understanding of Desired Content,

   and/or Performs Desired Skill Prior to Discharge (Caroline Valles RN)

   Status:  Ongoing (Caroline Valles RN)

   

=================================================================

Infection

=================================================================

   

 State:  Risk For (Caroline Valles RN)

 Related To:  Prolonged Labor or Induction; Premature/Prolonged Rupture

   of Membranes; Invasive Procedures (Caroline Valles RN)

 Goal(s):  The Patient will be Free of Infection, Vital Signs Stable

   and Lab Work within Normal Parameters (Caroline Valles RN)

 Interventions:  Instruct and Reinforce Proper Handwashing, Hygiene,

   and  Care Techniques to Patient and Family; Monitor Vital

   Signs; Monitor Patient for the Following Signs of Infection: Fever,

   Abdominal Tenderness, Unusual Discharge; Monitor Aminiotic Fluid,

   Urine and Lochia for Color and Odor; Observe Wounds, Incisions and

   Invasive Line Sites for Redness, Drainage and Edema; Assess IV Sites

   per Hospital Policy; Monitor Lab and Test Results and Notify

   Provider of Abnormal Findings; Assess Nutritional Status and Promote

   Good Nutrition (Caroline Valles RN)

 Outcome:  Patient will Remain Free of Infection (Caroline Valles RN)

   Status:  Ongoing (Caroline Valles RN)

 Outcome:  Infection will be Recognized Early to Allow for Prompt

   Treatment (Caroline Valles RN)

   Status:  Ongoing (Caroline Valles, RN)

 Outcome:  Patient will have Vital Signs Within Expected Range (Caroline Valles, RN)

   Status:  Ongoing (Caroline Valles RN)

   

=================================================================

Fluid Volume

=================================================================

   

 State:  Risk For (Caroline Valles RN)

 Related To:  Gestational Hypertension (Caroline Valles RN)

 Goal(s):  Patient will Achieve and Maintain a Balanced Fluid Volume

   Status; Hemodynamically Stable (Caroline Valles RN)

 Interventions:  Monitor Vital Signs; Auscultate Breath Sounds; Monitor

   Patient for Skin Turgor, Mucous Membranes, Dry Skin, Weakness,

   Headaches and Confusion; Provide Oral Fluids as Ordered; Initiate

   and Maintain Intravenous Fluids as Ordered; Monitor Intake and

   Output as Indicated Per Patient Status; Accurately Measure Blood

   Loss; Monitor Lab and Test Results as Obtained and Notify Provider

   of Abnormal Findings; Monitor Patient's Weight (Caroline Valles RN)

 Outcome:  Patient will have Clear Lung Sounds (Caroline Valles RN)

   Status:  Ongoing (Caorline Valles RN)

 Outcome:  Patient will have Vital Signs within Expected Range (Caroline Valles RN)

   Status:  Ongoing (Caroline Valles, RN)

 Outcome:  Urine Output will be within Expected Range (Caroline Valles, RN)

   Status:  Ongoing (Caroline Valles, RN)

 Outcome:  Patient will have Minimal Generalized or Upper Extremity

   Edema (Caroline Valles RN)

   Status:  Ongoing (Caroline Valles, RN)

   

=================================================================

Injury

=================================================================

   

 State:  Risk For (Caroline Valles RN)

 Related To:  Labor and Delivery Process (Caroline Valles RN)

 Goal(s):  Patient will Remain Free from Injury (Caroline Valles RN)

 Interventions:  Fetal Monitoring as per Hospital Protocol; Assess

   Neurological Status; Perform Risk Assessment of Patients with

   Induction and ; Perform Fall Risk Assessment and Prevention per

   Hospital Protocol; Perform DVT Risk Assessment and Prophylaxis per

   Hospital Protocol; Ensure that Oxygen, Suction, and Resuscitation

   Medications and Equipment are Readily Available; Confirm Patient ID

   Prior to Procedure(s) and Medication Administration per Hospital

   Policy (Caroline Valles RN)

 Outcome:  Successful Fall Risk Prevention (Caroline Valles RN)

   Status:  Ongoing (Caroline Valles, RN)

 Outcome:  Patient will Deliver Infant without Adverse [place] (Caroline Valles RN)

   Status:  Ongoing (Caroline Valles RN)

 Outcome:  Patient's Neurological Status will Remain Stable (Caroline Valles RN)

   Status:  Ongoing (Caroline Valles RN)

   

=================================================================

Impaired Skin Integrity

=================================================================

   

 State:  Risk For (Caroline Valles RN)

 Related To:  Vaginal Delivery; Prolonged Bedrest; Invasive Procedures

   (Caroline Valles RN)

 Goal(s):  Patient will Maintain Optimal Skin Integrity, Free of

   Breakdown, Injury or Infection (Caroline Valles, RN)

 Interventions:  Complete Screening for Pressure Ulcer Risk and

   Initiate Protocol per Hospital Policy; Monitor Site of Skin

   Impairment for Color Changes, Redness, Swelling, Warmth, Pain or

   Other Signs of  Infection; Encourage and Assist with Position

   Changes; Monitor Patient's Mobility Status; Provide Adequate

   Nutrition and Fluids; Teach Patient Appropriate Hygienic Care; Teach

   Patient/Family Skin Care Management (Caroline Valles, RN)

 Outcome:  Patient will not have Evidence of Injury Such as Skin

   Breakdown, Scrapes, Cuts, or Bruising (Caroline Valles RN)

   Status:  Ongoing (Caroline Valles RN)

 Outcome:  Patient will Report Any Altered Sensation or Pain at Site of

   Skin Impairment (Caroline Valles, RN)

   Status:  Ongoing (Caroline Valles RN)

 Outcome:  Patients Incisions and Wounds will be without Signs or

   Symptoms of Infection (Caroline Valles RN)

   Status:  Ongoing (Caroline Valles RN)

 Outcome:  Patient will Demonstrate Understanding of Plan to Heal Skin

   and Prevent Reinjury and Verbalize Risk Factors (Caroline Valles RN)

   Status:  Ongoing (Caroline Valles RN)

   

=================================================================

Parenting Impaired

=================================================================

   

 State:  Not Applicable (Caroline Valles RN)

   

=================================================================

Nutrition

=================================================================

   

 State:  Risk For (Caroline Valles RN)

 Related To:  Pregnancy (Caroline Valles RN)

 Goal(s):  Patient will have an Intake of Nutrients Sufficient to Meet

   Metabolic Needs (Caroline Valles RN)

 Interventions:  Nutritional Screening and Assessment per Hospital

   Policy; Consult Dietician for Further Assessment and Recommendations

   Regarding Food Preferences and Nutritional Support; Allow Patient to

   Plan and Order Diet when Possible; Monitor Laboratory Values That

   Indicate Nutritional Well-being; Consult Lactation Specialist for

   Nutritional Support Regarding Breastfeeding Requirements; Document

   Actual Weight Initially and Weekly (Do Not Estimate); Encourage

   Patient Participation in Maintaining a Food Log as Indicated;

   Educate Patient on the Importance of Maintaining an Adequate Caloric

   Intake (Caroline Valles RN)

 Outcome:  Patient will Receive Adequate Calories and Fluid Volume to

   Meet Metabolic Needs (Caroline Valles RN)

   Status:  Ongoing (Caroline Valles, RN)

 Outcome:  Patient will Select Foods or Meals that Support Adequate

   Nutrition (Caroline Valles, RN)

   Status:  Ongoing (Caroline Valles, RN)

   

=================================================================

Grieving

=================================================================

   

 State:  Not Applicable (Caroline Valles, RN)

   

=================================================================

Additional Care Plan

=================================================================

   

 State:  Not Applicable (Caroline Valles RN)

## 2017-03-09 NOTE — L&D FLOW SHEET
=================================================================

LD Flowsheet

=================================================================

Datetime Report Generated by CPN: 03/09/2017 07:00

   

   

=================================================================

Datetime: 03/08/2017 22:58

=================================================================

   

 NBP Sys/Dilma/Mean (mmHg):  127 (QS system process)

:  73 (QS system process)

:  95 (QS system process)

 Pulse:  96 (QS system process)

   

=================================================================

Datetime: 03/08/2017 22:43

=================================================================

   

 NBP Sys/Dilma/Mean (mmHg):  130 (QS system process)

:  75 (QS system process)

:  98 (QS system process)

 Pulse:  90 (QS system process)

   

=================================================================

Datetime: 03/08/2017 22:28

=================================================================

   

 NBP Sys/Dilma/Mean (mmHg):  137 (QS system process)

:  74 (QS system process)

:  100 (QS system process)

 Pulse:  96 (QS system process)

 Respirations:  18 (Betina Newell RN)

 Pain Scale:  1 (Betina Newell RN)

 Pain Assessment Comments:  Pt c/o back pain where epidural was.  (Betina Newell RN)

   

=================================================================

Datetime: 03/08/2017 22:13

=================================================================

   

 NBP Sys/Dilma/Mean (mmHg):  140 (QS system process)

:  86 (QS system process)

:  108 (QS system process)

 Pulse:  82 (QS system process)

   

=================================================================

Datetime: 03/08/2017 21:58

=================================================================

   

 NBP Sys/Dilma/Mean (mmHg):  139 (QS system process)

:  82 (QS system process)

:  105 (QS system process)

 Pulse:  82 (QS system process)

 Respirations:  18 (Betina Reece, RN)

 Temperature (F):  99.0 (Betina Reece, RN)

 Temperature (C):  37.2 (QS system process)

   

=================================================================

Datetime: 03/08/2017 21:43

=================================================================

   

 NBP Sys/Dilma/Mean (mmHg):  145 (QS system process)

:  71 (QS system process)

:  102 (QS system process)

 Pulse:  93 (QS system process)

 Respirations:  18 (Betina Reece, RN)

 Pain Scale:  0 (Betina Reece, RN)

   

=================================================================

Datetime: 03/08/2017 21:28

=================================================================

   

 NBP Sys/Dilma/Mean (mmHg):  141 (QS system process)

:  85 (QS system process)

:  107 (QS system process)

 Pulse:  80 (QS system process)

   

=================================================================

Datetime: 03/08/2017 21:14

=================================================================

   

Stage of Pregnancy:  Recovery (Betina Newell RN)

   

=================================================================

Datetime: 03/08/2017 20:58

=================================================================

   

Stage of Pregnancy:  Recovery (Betina Newell RN)

 NBP Sys/Dilma/Mean (mmHg):  131 (QS system process)

:  78 (QS system process)

:  102 (QS system process)

 Pulse:  87 (QS system process)

 Respirations:  18 (Betina Newell RN)

 Temperature (F):  98.7 (Betina Newell RN)

 Temperature (C):  37.1 (QS system process)

 Temperature Route:  Oral (Betina Newell RN)

 Pain Scale:  0 (Betina Newell RN)

   

=================================================================

Datetime: 03/08/2017 20:54

=================================================================

   

 Patient Care Comments:  del viable male (Betina Reece, RN)

   

=================================================================

Datetime: 03/08/2017 20:50

=================================================================

   

 I/O Interventions:  Cotton Discontinued (Betina Reece, RN)

 Patient Care Comments:  900 ml (Betina Reece, RN)

   

=================================================================

Datetime: 03/08/2017 20:49

=================================================================

   

 Dilatation (cm):  10.0 (Betina Reece, RN)

   

=================================================================

Datetime: 03/08/2017 20:48

=================================================================

   

 NBP Sys/Dilma/Mean (mmHg):  138 (QS system process)

:  87 (QS system process)

:  105 (QS system process)

 Pulse:  96 (QS system process)

 LaborFlag:  Labor (QS system process)

   

=================================================================

Datetime: 03/08/2017 20:46

=================================================================

   

 Patient Position/Activity:  Tailors (Alba Lattibeaudeir, RN)

   

=================================================================

Datetime: 03/08/2017 20:45

=================================================================

   

 Monitor Mode:  External (Betina Newell RN)

 Frequency (min):  2-3 (Betina Newell RN)

 Quality:  Strong (Betina Newell RN)

 Duration (sec):  60-90 (Betina Newell RN)

 Pattern:  Normal: <= 5 Contractions in 10 Minutes (Betina Newell RN)

 Resting Tone (Palpate):  Relaxed (Betina Newell RN)

 Monitor Mode:  External US (Betina Newell RN)

 FHR Baseline Rate :  175 (Betina Newell RN)

 FHR Baseline Changes:  Tachycardia (Betina Newell RN)

 Variability:  Moderate 6-25 bpm (Betina Newell RN)

 Accelerations:  None (Betina Newell RN)

 Decelerations:  Late (Betina Newell RN)

 Patient Care Comments:  Pt sitting up facing head of bed (Alba Gonzales RN)

 Communication:  RN Reviewed Strip; Provider at Bedside (Betina Newell RN)

   

=================================================================

Datetime: 03/08/2017 20:41

=================================================================

   

 Patient Care Comments:  pressure bag applied to LR (Alba Gonzales RN)

   

=================================================================

Datetime: 03/08/2017 20:36

=================================================================

   

 Actions for Fetal Decelerations:  Hands and Knees (Alba Gonzales RN)

   

=================================================================

Datetime: 03/08/2017 20:34

=================================================================

   

 Contraction Comments:  Trial push to reduce cervix. (Betina Newell RN)

 IV/Blood Work:  IV Bolus Started (Betina Newell RN)

 Oxygen Amount :  10 (Alba Lattibeaudeir, RN)

 Oxygen Method:  Non-Rebreather (Alba Lattibeaudeir, RN)

   

=================================================================

Datetime: 03/08/2017 20:32

=================================================================

   

 Stage 2 Comments:  Dr. Wagner at bs. (Alba Lattibqianudeir, RN)

   

=================================================================

Datetime: 03/08/2017 20:30

=================================================================

   

 Monitor Mode:  External US (Betina Newell RN)

 FHR Baseline Rate :  175 (Betina Newell RN)

 FHR Baseline Changes:  Tachycardia (Betina Newell RN)

 Variability:  Minimal - Undetectable to <=5 bpm (Betina Newell, RN)

 Accelerations:  15X15 (Betina Newell, RN)

 Decelerations:  Early; Variable (Betina Newell, RN)

 Comments:  variable down to 100's; 2 lates (Betina Newell, RN)

   

=================================================================

Datetime: 03/08/2017 20:23

=================================================================

   

 Dilatation (cm):  9.5 (Betina Newell, RN)

 Station:  1 (Betina Newell, RN)

 Exam by:  THAD Paulson (Betina Newell, RN)

 Vaginal Bleeding:  None (Betina Newell, RN)

 Cervix, Consistency:  Soft (Betina Newell, RN)

 Cervix, Position:  Anterior (Betina Newell, RN)

   

=================================================================

Datetime: 03/08/2017 20:20

=================================================================

   

 Patient Care Comments:  O2 d/c'd (Betina Newell, RN)

   

=================================================================

Datetime: 03/08/2017 20:18

=================================================================

   

 NBP Sys/Dilma/Mean (mmHg):  110 (QS system process)

:  66 (QS system process)

:  83 (QS system process)

 Pulse:  91 (QS system process)

 LaborFlag:  Labor (QS system process)

   

=================================================================

Datetime: 03/08/2017 20:15

=================================================================

   

 Monitor Mode:  External (Betina Reece, RN)

 Frequency (min):  1-4 (Betina Reece, RN)

 Frequency (min):  2-5 (Betina Reece, RN)

 Quality:  Moderate to Strong (Betina Reece, RN)

 Duration (sec):   (Betina Reece, RN)

 Duration (sec):  70-90 (Betina Reece, RN)

 Pattern:  Normal: <= 5 Contractions in 10 Minutes (Betina Reece, RN)

 Resting Tone (Palpate):  Relaxed (Betina Reece, RN)

 Monitor Mode:  External US (Betina Reece, RN)

 FHR Baseline Rate :  175 (Betina Reece, RN)

 FHR Baseline Rate :  170 (Betina Reece, RN)

 FHR Baseline Changes:  Tachycardia (Betina Reece, RN)

 Variability:  Minimal - Undetectable to <=5 bpm (Betina Reece, RN)

 Accelerations:  None (Betina Reece, RN)

 Decelerations:  Early; Late (Betina Reece, RN)

 Decelerations:  Early (Betina Reece, RN)

   

=================================================================

Datetime: 03/08/2017 20:04

=================================================================

   

 NBP Sys/Dilma/Mean (mmHg):  115 (QS system process)

:  70 (QS system process)

:  87 (QS system process)

 Pulse:  85 (QS system process)

 LaborFlag:  Labor (QS system process)

   

=================================================================

Datetime: 03/08/2017 20:00

=================================================================

   

 Monitor Mode:  External (Betina Reece, RN)

 Frequency (min):  2-5 (Betina Reece, RN)

 Quality:  Moderate to Strong (Betina Reece, RN)

 Duration (sec):   (Betina Reece, RN)

 Pattern:  Normal: <= 5 Contractions in 10 Minutes (Betina Reece, RN)

 Resting Tone (Palpate):  Relaxed (Betina Reece, RN)

 Monitor Mode:  External US (Betina Reece, RN)

 FHR Baseline Rate :  170 (Betina Reece, RN)

 FHR Baseline Changes:  Tachycardia (Betina Reece, RN)

 Variability:  Minimal - Undetectable to <=5 bpm (Betina Reece, RN)

 Accelerations:  None (Betina Reece, RN)

 Decelerations:  Variable (Betina Newell, YADIRA)

   

=================================================================

Datetime: 03/08/2017 19:58

=================================================================

   

 Patient Position/Activity:  Right Lateral; Peanut Ball (Betina Newell RN)

   

=================================================================

Datetime: 03/08/2017 19:54

=================================================================

   

 Dilatation (cm):  7.0 (Betina Newell RN)

 Effacement (%):  80 (Betina Newell RN)

 Station:  0 (Betina Newell RN)

 Exam by:  THAD Paulson (Betina Newell RN)

 Vaginal Bleeding:  None (Betina Newell RN)

 Cervix, Consistency:  Soft (Betina Newell RN)

 Cervix, Position:  Midposition (Betina Newell RN)

 Membrane Comments:  Forebag ruptured-clear fluid (Betina Newell RN)

 Communication:  Provider at Bedside (Betina Newell RN)

   

=================================================================

Datetime: 03/08/2017 19:48

=================================================================

   

 NBP Sys/Dilma/Mean (mmHg):  129 (QS system process)

:  83 (QS system process)

:  99 (QS system process)

 Pulse:  108 (QS system process)

 LaborFlag:  Labor (QS system process)

   

=================================================================

Datetime: 03/08/2017 19:47

=================================================================

   

 Antibiotics:  Penicillin IV (Units) @ 2,500,000 (Betina Newell RN)

 Patient Care Comments:  O2 applied per face rebreather mask (Betina Newell RN)

   

=================================================================

Datetime: 03/08/2017 19:45

=================================================================

   

 Monitor Mode:  External (Betina Newell RN)

 Frequency (min):  2-4 (Betina Newell RN)

 Quality:  Moderate to Strong (Betina Reece, RN)

 Quality:  Moderate (Betina Reece, RN)

 Duration (sec):   (Betina Reece, RN)

 Duration (sec):  60-80 (Betina Reece, RN)

 Pattern:  Normal: <= 5 Contractions in 10 Minutes (Betina Reece, RN)

 Resting Tone (Palpate):  Relaxed (Betina Reece, RN)

 Monitor Mode:  External US (Betina Recee, RN)

 FHR Baseline Rate :  175 (Betina Reece, RN)

 FHR Baseline Rate :  170 (Betina Reece, RN)

 FHR Baseline Changes:  Tachycardia (Betina Reece, RN)

 Variability:  Minimal - Undetectable to <=5 bpm (Betina Reece, RN)

 Accelerations:  None (Betina Reece, RN)

 Decelerations:  None (Betina Reece, RN)

   

=================================================================

Datetime: 03/08/2017 19:34

=================================================================

   

 NBP Sys/Dilma/Mean (mmHg):  124 (QS system process)

:  79 (QS system process)

:  96 (QS system process)

 Pulse:  102 (QS system process)

 LaborFlag:  Labor (QS system process)

   

=================================================================

Datetime: 03/08/2017 19:30

=================================================================

   

 Monitor Mode:  External (Betina Reece, RN)

 Frequency (min):  2-5 (Betina Reece, RN)

 Quality:  Moderate to Strong (Betina Reece, RN)

 Duration (sec):   (Betina Reece, RN)

 Resting Tone (Palpate):  Relaxed (Betina Reece, RN)

 Monitor Mode:  External US (Betina Reece, RN)

 FHR Baseline Rate :  170 (Betina Reece, RN)

 FHR Baseline Changes:  Tachycardia (Betina Reece, RN)

 Variability:  Minimal - Undetectable to <=5 bpm (Betina Reece, RN)

 Accelerations:  None (Betina Reece, RN)

 Decelerations:  None (Betina Reece, RN)

 Level of Consciousness:  Fully Conscious (Betina Reece, RN)

 DTR's/Clonus:  DTRs 1+ (Betina Reece, RN)

 Headache:  Denies (Betina Reece, RN)

 Breath Sounds, Left:  Clear and Equal (Betina Reece, RN)

 Breath Sounds, Right:  Clear and Equal (Betina Reece, RN)

 Nausea/Vomiting:  Denies (Betina Reece, RN)

 RUQ Epigastric Pain:  Denies (Betina Reece, RN)

   

=================================================================

Datetime: 03/08/2017 19:18

=================================================================

   

 NBP Sys/Dilma/Mean (mmHg):  117 (QS system process)

:  80 (QS system process)

:  94 (QS system process)

 Pulse:  107 (QS system process)

 LaborFlag:  Labor (QS system process)

   

=================================================================

Datetime: 03/08/2017 19:15

=================================================================

   

Stage of Pregnancy:  Labor (Elaine Hernandez RN)

 Respirations:  18 (Elaine Hernandez RN)

 Monitor Mode:  External (Elaine Hernandez RN)

 Frequency (min):  1.5-4 (Elaine Hernandez RN)

 Quality:  Moderate (Elaine Hernandez RN)

 Duration (sec):   (Elaine Hernandez RN)

 Duration (sec):   (Elaine Hernandez, RN)

 Resting Tone (Palpate):  Relaxed (Elaine Hernandez RN)

 Monitor Mode:  External US (Elaine Hernandez RN)

 FHR Baseline Rate :  170 (Elaine Hernandez RN)

 FHR Baseline Changes:  Tachycardia (Elaine Hernandez RN)

 FHR Baseline Changes:  No Baseline Change (Elaine Hernandez RN)

 Variability:  Minimal - Undetectable to <=5 bpm (Elaine Hernandez,

   YADIRA)

 Accelerations:  None (Elaine Hernandez RN)

 Decelerations:  None (Elaine Hernandez, RN)

 Pain Relief Measures:  Comfort Measures (Elaine Hernandez, RN)

 Pain Coping:  Talking Through Contractions (Elaine Hernandez, RN)

 IV/Blood Work:  IV Infusing per Order (Elaine Hernandez, RN)

 Patient Position/Activity:  Left Tilt; Tailors (Elaine Hernandez,

   RN)

 Comfort Measures:  Family Support (Elaine Hernandez, YADIRA)

 Communication:  RN at Bedside; RN Reviewed Strip (Elaine Hernandez RN)

 LaborFlag:  Labor (QS system process)

   

=================================================================

Datetime: 03/08/2017 19:04

=================================================================

   

 NBP Sys/Dilma/Mean (mmHg):  125 (QS system process)

:  89 (QS system process)

:  103 (QS system process)

 Pulse:  100 (QS system process)

 LaborFlag:  Labor (QS system process)

   

=================================================================

Datetime: 03/08/2017 19:00

=================================================================

   

Stage of Pregnancy:  Labor (Elaine Hernandez RN)

 Respirations:  18 (Elaine Hernandez RN)

 Monitor Mode:  External (Ealine Hernandez RN)

 Monitor Interventions for UA:  Lambert Adjusted (Elaine Hernandez RN)

 Frequency (min):  2-3 (Elaine Hernandez RN)

 Quality:  Moderate (Elaine Hernandez RN)

 Duration (sec):   (Elaine Hernandez RN)

 Resting Tone (Palpate):  Relaxed (Elaine Hernandez RN)

 Monitor Mode:  External US (Elaine Hernandez RN)

 FHR Baseline Rate :  170 (Elaine Hernandez RN)

 FHR Baseline Changes:  Tachycardia (Elaine Hernandez RN)

 Variability:  Minimal - Undetectable to <=5 bpm (Elaine Hernandez RN)

 Accelerations:  None (Elaine Hernandez RN)

 Decelerations:  None (Elaine Hernandez RN)

 Pain Scale:  0 (Elaine Hernandez RN)

 Pain Presence:  None/Denies (Elaine Hernandez RN)

 Pain Type:  N/A (Elaine Hernandez RN)

 Pain Relief Measures:  Comfort Measures (Elaine Hernandez RN)

 Pain Coping:  Talking Through Contractions (Elaine Hernandez RN)

 IV/Blood Work:  IV Infusing per Order (Elaine Hernandez, RN)

 Patient Position/Activity:  Left Tilt; Tailors (Elaine Hernandez,

   RN)

 Comfort Measures:  Family Support (Elaine Hernandez, YADIRA)

 Communication:  RN at Bedside; RN Reviewed Strip (Elaine Hernandez RN)

 LaborFlag:  Labor (QS system process)

## 2017-03-10 NOTE — L&D CURRENT ADMISSION
=================================================================

Current Admit

=================================================================

Datetime Report Generated by CPN: 03/10/2017 06:00

   

   

=================================================================

ADMISSION INFORMATION

=================================================================

   

Current Admit Date/Time:  03/08/2017 15:40    (03/08/2017 15:43:Marii Lenz RN)

Reason for Admission:  Rupture of Membranes    (03/08/2017 15:43:Marii Lenz RN)

Chief Complaint:  Suspected Rupture of Membranes    (03/08/2017

   15:43:Marii Lenz RN)

EGA per Dates:  37.0    (03/08/2017 15:43:QS system process)

Method of Arrival:  Wheelchair    (03/08/2017 15:43:Marii Lenz RN)

Admitted From:  Home    (03/08/2017 15:43:Marii Lenz RN)

Reason for Induction:  Not Applicable    (03/08/2017 15:43:Marii Lenz RN)

Prenatal Records Available:  Yes    (03/08/2017 15:43:THAD Ch)

General Admission Information:  Reviewed; Updated    (03/08/2017

   15:43:THAD Ch)

General Admission Reviewed By:  SERENA ONEIL    (03/08/2017

   15:43:THAD Ch)

   

=================================================================

BELONGINGS/ADVANCED DIRECTIVES

=================================================================

   

Valuables/Personal Effects:  Cell Phone    (03/08/2017 15:43:THAD Ch)

Other Belongings:  see signed belongings conse    (03/08/2017

   15:43:Marii Lenz RN)

Disposition of Belongings:  Kept with Patient    (03/08/2017

   15:43:THAD Ch)

Advance Direct for Healthcare:  No, and Wants No Information   

   (03/08/2017 15:43:Marii Lenz RN)

Durable Power of :  No    (03/08/2017 15:43:Marii Lenz RN)

Living Will:  No    (03/08/2017 15:43:Marii Lenz RN)

Organ Donor:  No    (03/08/2017 15:43:Marii Lenz RN)

Pt Rights Information Given:  Yes    (03/08/2017 15:43:Marii Lenz RN)

Pt Understands Pt Rights:  Yes    (03/08/2017 15:43:Marii Lenz RN)

   

=================================================================

LEARNING ASSESSMENT

=================================================================

   

Knowledge Level:  Understands L_D Process; Understands Care Activities;

   Had Pre-Hospital Education; Understands Diagnosis    (03/08/2017

   15:43:Marii Lenz RN)

Barriers to Learning:  None    (03/08/2017 15:43:Marii Lenz RN)

Learning Readiness:  Motivated    (03/08/2017 15:43:Marii Lenz RN)

Learns Best By:  1 to 1 Instruction    (03/08/2017 15:43:Marii Lenz RN)

Learning Needs:  Labor and Delivery Process; Pain Management; Symptoms

   to Report; Treatment Plan; Medication; Diagnosis; Nutrition;

   Equipment; Infant Care; Community Resources    (03/08/2017

   15:43:Marii Lenz RN)

   

=================================================================

DOMESTIC VIOLANCE SCREENING

=================================================================

   

Dom Viol Threatened/Hurt:  No    (03/08/2017 15:43:Marii Lenz RN)

Hx of Abuse/Neglect past 2yrs:  No    (03/08/2017 15:43:Marii Lenz RN)

Feel Unsafe Going Home:  No    (03/08/2017 15:43:Marii Lenz RN)

Addt'l Observ Indicating Abuse:  No    (03/08/2017 15:43:Marii Lenz RN)

Reason Unable to Complete Screen:  N/A, Screen Completed    (03/08/2017

   15:43:Marii Lenz RN)

Considered Personal Harm/Suicide:  No    (03/08/2017 15:43:Marii Lenz RN)

   

=================================================================

NUTRITIONAL/FUNCTIONAL SCREENING

=================================================================

   

Problem with Appetite >5 Days:  No    (03/08/2017 15:43:Marii Lenz RN)

Chew/Swallow Difficulties:  No    (03/08/2017 15:43:Marii Lenz RN)

Inappropriate Wt Gain/Loss:  No    (03/08/2017 15:43:Marii Lenz RN)

Presence Skin Breakdown/Ulcer:  No    (03/08/2017 15:43:Marii Lenz RN)

Special Diet:  No    (03/08/2017 15:43:Marii Lenz RN)

Pt Requests Dietician Visit:  No    (03/08/2017 15:43:Marii Lenz RN)

Hx of Any of the Following?:  N/A    (03/08/2017 15:43:Marii Lenz RN)

New Diagnosis of:  N/A    (03/08/2017 15:43:Marii Lenz RN)

Requires Assist w/Ambulation:  No    (03/08/2017 15:43:Marii Lenz RN)

Uses Assist Device to Ambulate:  No    (03/08/2017 15:43:Marii Lenz RN)

Pt Requires Help w/ADL's:  No    (03/08/2017 15:43:Marii Lenz RN)

## 2017-03-10 NOTE — L&D GENERAL ADMISSION
=================================================================

General Admit

=================================================================

Datetime Report Generated by CPN: 03/10/2017 06:00

   

   

=================================================================

PREGNANCY INFORMATION

=================================================================

   

Patient Age:  21    (2017 15:01:QS system process)

EDC:  2017 00:00    (2017 15:34:Mimi Jamil RN)

:  2    (2017 15:34:Marii Lenz RN)

Para:  1    (2017 15:34:Alba Gonzales RN)

Term:  1    (2017 15:34:Alba Gonzales RN)

:  0    (2017 15:34:Marii Lenz RN)

Spontaneous Abortions:  0    (2017 15:34:Marii Lenz RN)

Induced Abortions:  0    (2017 15:34:Marii Lenz RN)

Livin    (2017 15:34:THAD Ch)

Cesareans:  0    (2017 15:34:Alba Gonzales RN)

VBACs:  0    (2017 15:34:Alba Gonzales RN)

Ectopic:  0    (2017 15:34:Alba Gonzales RN)

Multiple Births:  0    (2017 15:34:Alba Gonzales RN)

Baby, Number in Womb:  1    (2017 15:34:THAD Ch)

   

=================================================================

PRENATAL CARE

=================================================================

   

Adequate Prenatal Care:  No    (2017 15:34:Caroline Valles RN)

Height (in):  60    (2017 15:13:QS system process)

   

=================================================================

ALLERGIES

=================================================================

   

Medication Allergy:  No    (2017 15:34:Marii Lenz RN)

Medication Allergies:  No Known Allergies (2017)    (2017

   15:11:QS system process)

Latex Allergy:  No Latex Allergies    (2017 15:34:Marii Lenz RN)

   

=================================================================

COMMUNICATION

=================================================================

   

Primary Language:  English    (2017 15:34:Mimi Jamil RN)

Medical Tx Preferred Language:  English    (2017 15:34:Mimi Jamil RN)

   

=================================================================

DEMOGRAPHICS

=================================================================

   

Address:  [address] [place] [address]   [id number]    (2017 15:01:QS system process)

Zipcode:  [id number]    (2017 15:01:QS system process)

Home Telephone:  [phone]    (2017 15:01:QS system process)

N:      (2017 15:01:QS system process)

Next of Kin Name:  BHAVANA MORRIS    (2017 15:01:QS system

   process)

Next of Kin Phone:  [phone]    (2017 15:01:QS system

   process)

Next of Kin Relationship:  MO    (2017 15:01:QS system process)

YOB: 1995    (2017 15:01:QS system process)

Marital Status:  Single    (2017 15:01:QS system process)

Sex:  Female    (2017 15:01:QS system process)

Race:      (2017 15:01:QS system process)

Ethnicity:  Non- or     (2017 15:01:QS system

   process)

Denominational:  Confucianist    (2017 15:01:QS system process)

   

=================================================================

DRUG AND ALCOHOL USE

=================================================================

   

Alcohol:  No    (2017 15:34:THAD Ch)

Cigarettes:  Former Smoker. [id number]    (2017 15:34:THAD Ch)

Marijuana:  No    (2017 15:34:THAD Ch)

Cocaine:  No    (2017 15:34:THAD Ch)

Other Illicit Drugs:  No    (2017 15:34:THAD Ch)

   

=================================================================

VACCINE HISTORY

=================================================================

   

Influenza Vaccine:  Yes    (2017 15:34:THAD Ch)

Influenza Date:  3/3/17    (2017 15:34:THAD Ch)

Pneumococcal Vaccine:  No    (2017 15:34:THAD Ch)

Tetanus Vaccine:  Yes    (2017 15:34:THAD Ch)

Tetanus Date:  3/3/17    (2017 15:34:THAD Ch)

Tdap Vaccine:  Yes    (2017 15:34:THAD Ch)

Tdap Date:  3/3/17    (2017 15:34:THAD Ch)

Hepatitis B Vaccine:  Yes    (2017 15:34:THAD Ch)

   

=================================================================



=================================================================

   

Pediatrician:  [facility]    (2017 15:34:THAD Ch)

Feeding Preference:  Breast    (2017 15:34:THAD Ch)

Benefit of Breast Feed Discussed:  Yes    (2017 15:34:THAD Ch)

Circumcision:  No    (2017 15:34:THAD Ch)

Prenatal Classes Attended:  No    (2017 15:34:THAD Ch)

Tubal Ligation:  No    (2017 15:34:THAD Ch)

Tubal Authorization Signed:  N/A    (2017 15:34:THAD Ch)

 Consent:  N/A    (2017 15:34:THAD Ch)

 Consent Signed:  N/A    (2017 15:34:THAD Ch)

Pain Management Plans:  Epidural    (2017 15:34:THAD Ch)

Support Person:  Guy    (2017 15:34:THAD Ch)

Support Person Relationship:  Significant Other    (2017

   15:34:THAD hC)

Cultural/Spritual Practice:  No    (2017 15:34:THAD Ch)

Spir/Cult Dietary Needs:  No    (2017 15:34:THAD Ch)

   

=================================================================

LIVING SITUATION/DISCHARGE PLAN

=================================================================

   

Living Arrangements:  House    (2017 15:34:THAD Ch)

Adequate Access to::  Electric; Heat; Refrigeration; Plumbing/Running

   water; Phone; Transportation    (2017 15:34:THAD Ch)

WIC Program:  Yes    (2017 15:34:THAD Ch)

Discharge  Person:  Guy garza    (2017 15:34:THAD Ch)

Person to Help after Discharge:  Guy garza    (2017

   15:34:THAD Ch)

Currently Using Commun Resources:  Yes    (2017 15:34:THAD Ch)

Specify Current Resource Used:  Medicaid    (2017 15:34:TAHD Ch)

Outside Agency/:  No    (2017 15:34:THAD Ch)

Car Seat for Discharge:  No    (2017 15:34:THAD Ch)

Adoption Requested:  No    (2017 15:34:THAD Ch)

Pt Contact w/infant Post Birth:  N/A    (2017 15:34:THAD Ch)

   

=================================================================

PRENATAL LABS

=================================================================

   

Blood Type:  O Positive    (2017 15:34:Caroline Valles RN)

Antibody Screen:  negative    (2017 15:34:Betina Newell RN)

Hemoglobin:  10.4 L    (2017 07:20:QS system process)

Hematocrit:  30.8 L    (2017 07:20:QS system process)

MCV:  92    (2017 07:20:QS system process)

Group Beta Strep:  Positive    (2017 15:34:Caroline Valles RN)

HIV Results:  NEGATIVE    (2017 15:37:QS system process)

Rubella:  Immune    (2017 15:37:Anaid Hylton RN)

Rubella Titer:  15.40    (2017 15:37:QS system process)

Varicella:  Non Susceptible    (2017 15:34:Betina Newell RN)

   

=================================================================

OB/PREVIOUS PREGNANCY HISTORY

=================================================================

   

History of Previous :  No    (2017 15:34:THAD Ch)

History of Gestational Diabetes:  No    (2017 15:34:THAD Ch)

History of PIH:  No    (2017 15:34:THAD Ch)

History of Incompetent Cervix:  No    (2017 15:34:THAD Ch)

History of Placenta Previa/Abrup:  No    (2017 15:34:THAD Ch)

History of Macrosomia:  No    (2017 15:34:THAD Ch)

History of IUGR:  No    (2017 15:34:THAD Ch)

History of Postpartum Hemorrhage:  No    (2017 15:34:THAD Ch)

History of Loss/Stillborn:  No    (2017 15:34:THAD Ch)

History of  Death:  No    (2017 15:34:THAD Ch)

History of D (Rh) Sensitization:  No    (2017 15:34:THAD Ch)

History Recurrent Loss/Stillborn:  No    (2017 15:34:THAD Ch)

History Depression/PP Depression:  No    (2017 15:34:THAD Ch)

History of  Uterine Anomaly/RUPINDER:  No    (2017 15:34:THAD Ch)

History of Infertility:  No    (2017 15:34:THAD Ch)

History of  ART Treatment:  No    (2017 15:34:THAD Ch)

History of RUPINDER:  No    (2017 15:34:THAD Ch)

Comments Obstetrical History:  G1:2015, 5lb 6 ozs

   G2: current, no PNCz    (2017 15:34:THAD Ch)

   

=================================================================

MEDICAL HISTORY

=================================================================

   

Med Hx Diabetes:  No    (2017 15:34:THAD Ch)

Med Hx Hypertension:  No    (2017 15:34:THAD Ch)

Med Hx Heart Disease:  No    (2017 15:34:THAD Ch)

Med Hx Autoimmune Disorder:  No    (2017 15:34:THAD Ch)

Med Hx Kidney Disease/UTI:  No    (2017 15:34:THAD Ch)

Med Hx Neurologic/Epilepsy:  No    (2017 15:34:THAD Ch)

Med Hx Psychiatric Disorders:  No    (2017 15:34:THAD Ch)

Med Hx Hepatitis/Liver Disease:  No    (2017 15:34:THAD Ch)

Med Hx Varicosities/Phlebitis:  No    (2017 15:34:THAD Ch)

Med Hx Thyroid Dysfunction:  No    (2017 15:34:THAD Ch)

Med Hx Trauma/Violence:  No    (2017 15:34:THAD Ch)

Med Hx Blood Transfusion:  No    (2017 15:34:THAD Ch)

Med Hx Pulmonary (Asthma,TB):  No    (2017 15:34:THAD Ch)

Med Hx Breast:  No    (2017 15:34:THAD Ch)

Med Hx GYN Surgery:  No    (2017 15:34:THAD Ch)

Med Hx Hospitalization/Surgery:  No    (2017 15:34:THAD Ch)

Med Hx Anesthetic Complications:  No    (2017 15:34:THAD Ch)

Med Hx Abnormal Pap Smear:  No    (2017 15:34:THAD Ch)

Other Medical Diseases:  No    (2017 15:34:THAD Ch)

Med Hx Significant Family Hx:  No    (2017 15:34:THAD Ch)

   

=================================================================

INFECTIOUS HISTORY

=================================================================

   

Inf Hx Gonorrhea:  No    (2017 15:34:THAD Ch)

Inf Hx Chlamydia:  No    (2017 15:34:THAD Ch)

Inf Hx Syphilis:  No    (2017 15:34:THAD Ch)

Inf Hx HIV/AIDS:  No    (2017 15:34:THAD Ch)

Inf Hx Human Papilloma Virus:  No    (2017 15:34:THAD Ch)

Inf Hx Pt/Partner Genital Herpes:  No    (2017 15:34:THAD Ch)

Inf Hx Tuberculosis/Exposure:  No    (2017 15:34:THAD Ch)

Inf Hx Hepatitis B,C:  No    (2017 15:34:THAD Ch)

Inf Hx Rash or Viral Illness:  No    (2017 15:34:THAD Ch)

   

=================================================================

GENETIC HISTORY

=================================================================

   

Gen Hx Age >=35 at LOLI:  No    (2017 15:34:THAD Ch)

Gen Hx Thalassemia:  No    (2017 15:34:THAD Ch)

Gen Hx Congenital Heart Defect:  No    (2017 15:34:THAD Ch)

Gen Hx Neural Tube Defect:  No    (2017 15:34:THAD Ch)

Gen Hx Down's Syndrome:  No    (2017 15:34:THAD Ch)

Gen Hx Akhil-Sachs:  No    (2017 15:34:THAD Ch)

Gen Hx [place]avan:  No    (2017 15:34:THAD Ch)

Gen Hx Familial Dysautonomia:  No    (2017 15:34:THAD Ch)

Gen Hx Sickle Cell Disease/Trait:  No    (2017 15:34:THAD Ch)

Gen Hx Hemophilia/Blood Disorder:  No    (2017 15:34:THAD Ch)

Gen Hx Muscular Dystrophy:  No    (2017 15:34:THAD Ch)

Gen Hx Cystic Fibrosis:  No    (2017 15:34:THAD Ch)

Gen Hx Huntingtons Chorea:  No    (2017 15:34:THAD Ch)

Gen Hx Mental Retardation/Autism:  No    (2017 15:34:THAD Ch)

Gen Hx Tested for Fragile X:  No    (2017 15:34:THAD Ch)

Gen Hx Other Inher/Chromosomal:  No    (2017 15:34:THAD Ch)

Gen Hx Maternal Metabolic DO:  No    (2017 15:34:THAD Ch)

Gen Hx Pt Father or FOB Defect:  No    (2017 15:34:THAD Ch)

Gen Hx Other Genetic History:  No    (2017 15:34:THAD Ch)

Gen Hx Drugs/Meds since LMP:  No    (2017 15:34:THAD Ch)

## 2017-03-10 NOTE — L&D CARE PLAN
=================================================================

LD CARE PLANS

=================================================================

Datetime Report Generated by CPN: 03/10/2017 06:15

   

   

=================================================================

Datetime: 2017 15:28

=================================================================

   

   

=================================================================

Pain

=================================================================

   

 State:  Risk For (Caroline Valles RN)

 Related To:  Labor and Delivery Process; Disease Process; Treatment

   and Procedures; Post Partum (Caroline Valles RN)

 Goal(s):  Patients Pain will be Assessed and Managed; Patient will

   Verbalize Adequate Relief of Pain or the Ability to [place] with

   Current Pain (Caroline Valles RN)

 Interventions:  Assess Pain Severity on Scale of 0 (None) to 5

   (Severe); Assess Type, Location and Intensity of Pain Each Time

   Client Reports Discomfort and Notify Provider if Unusal Pain

   Develops; Encourage Proper Breathing and Relaxation Techniques;

   Offer Alternatives Such as Repositioning, Calm Environment,

   Massages, Diversional Activities, Ice Pack, Splinting, and

   Ambulation; Administer Analgesics as Ordered; Assist with Epidural

   Placement as Appropriate; Evaluate Therapeutic Effectiveness of

   Medication and Treatments (Caroline Valles RN)

 Outcome:  Patient will Report Absence or Relief of Pain Consistent

   with Established Pain Goal (Caroline Valles RN)

   Status:  Ongoing (Caroline Valles RN)

 Outcome:  Patient will have a Decrease in Signs and Symptoms of

   Discomfort (Caroline Valles RN)

   Status:  Ongoing (Caroline Valles RN)

 Outcome:  Pain will be Controlled During Procedures (Caroline Valles RN)

   Status:  Ongoing (Caroline Valles RN)

   

=================================================================

Anxiety

=================================================================

   

 State:  Risk For (Caroline Valles RN)

 Related To:  Labor and Delivery Process; Medical Interventions;

   Significant Life Event (Caroline Valles RN)

 Goal(s):  Patient will have Decreased Anxiety and be able to Function

   at Acceptable Levels (Caroline Valles RN)

 Interventions:  Assess Verbal and Nonverbal  Behavioral Indicators of

   Anxiety; Assist Patient to Identify and Verbalize Symptoms of

   Anxiety; Identify and Demonstrate Techniques to Control Anxiety;

   Assist Patient with Coping Mechanisms to Manage Anxiety; Provide

   Theraputic Touch for the Patient; Explain to Patient, Using a Calm

   Reassuring Approach and Nonmedical Terms, All Activities,

   Procedures, and Concerns; Instruct Patient and Family about Post

   Discharge Care, Limitations, Symptoms to Report and Resources

   Available (Caroline Valles RN)

 Outcome:  Patient will Identify, Verbalize and Demonstrate Techniques

   to Control Anxiety (Caroline Valles RN)

   Status:  Ongoing (Caroline Valles RN)

 Outcome:  Patient's Posture, Facial Expressions, Gestures and Activity

   Level will Reflect Decreased Anxiety (Caroline Valles RN)

   Status:  Ongoing (Caroline Valles RN)

 Outcome:  Patient will Verbalize a Sense of Control and/or Acceptance

   of the Situation (Carolnie Valles RN)

   Status:  Ongoing (Caroline Valles RN)

 Outcome:  Patient will Identify and Utilize Support Person (Caroline Valles RN)

   Status:  Ongoing (Caroline Valles RN)

   

=================================================================

Knowledge Deficit

=================================================================

   

 State:  Risk For (Caroline Valles RN)

 Related To:  Labor and Delivery Process; Surgical Procedures;

   Treatment and Procedures; Impending Alterations in Family Dynamics;

   Feeding and Infant Care; Community Resources and Available Support

   Mechanisms (Caroline Valles RN)

 Goal(s):  Patient will Accurately Verbalize Understanding of Plan of

   Care and Treatment; Patient and Family will Accurately Verbalize

   Understanding of the Disease Process (Caroline Valles RN)

 Interventions:  Assess Motivation and Willingness of Patient/Family to

   Learn; Assess Preferred Learning Mode: One to One Instruction,

   Reading, Videos, Group Discussion or Demonstration; Assess Barriers

   to Learning: Pain, Emotional State, Language Barrier, Cognitive

   Impairment, Visual or Hearing Deficits; Assess Patient and Family

   Knowledge of Disease Process, Medications and Treatment; Discuss

   Therapy and/or Treatment Options, Describe Rationale Behind

   Management, Therapy and Treatment Recommendations; Instruct Patient

   and Family on Signs and Symptoms to Report; Instruct Patient and

   Family on Medication Effects and Side Effects; Provide Appropriate

   and Timely Education Using Multiple Techniques; Provide Patient and

   Family with Support Group Information and Resources; Give Clear and

   Thorough Explanations and Demonstrations (Caroline Valles RN)

 Outcome:  Patient and Family will Verbalize Understanding of

   Condition, Treatment and Signs and Symptoms to Report (Caroline Valles RN)

   Status:  Ongoing (Caroline Valles RN)

 Outcome:  Patient will Identify Perceived Learning Needs and Express

   Motivation to Learn (Caroline Valles RN)

   Status:  Ongoing (Caroline Valles RN)

 Outcome:  Patient will Verbalize Understanding of Desired Content,

   and/or Performs Desired Skill Prior to Discharge (Caroline Valles RN)

   Status:  Ongoing (Caroline Valles RN)

   

=================================================================

Infection

=================================================================

   

 State:  Risk For (Caroline Valles RN)

 Related To:  Prolonged Labor or Induction; Premature/Prolonged Rupture

   of Membranes; Invasive Procedures (Caroline Valles RN)

 Goal(s):  The Patient will be Free of Infection, Vital Signs Stable

   and Lab Work within Normal Parameters (Caroline Valles RN)

 Interventions:  Instruct and Reinforce Proper Handwashing, Hygiene,

   and  Care Techniques to Patient and Family; Monitor Vital

   Signs; Monitor Patient for the Following Signs of Infection: Fever,

   Abdominal Tenderness, Unusual Discharge; Monitor Aminiotic Fluid,

   Urine and Lochia for Color and Odor; Observe Wounds, Incisions and

   Invasive Line Sites for Redness, Drainage and Edema; Assess IV Sites

   per Hospital Policy; Monitor Lab and Test Results and Notify

   Provider of Abnormal Findings; Assess Nutritional Status and Promote

   Good Nutrition (Caroline Valles RN)

 Outcome:  Patient will Remain Free of Infection (Caroline Valles RN)

   Status:  Ongoing (Caroline Valles RN)

 Outcome:  Infection will be Recognized Early to Allow for Prompt

   Treatment (Caroline Valles RN)

   Status:  Ongoing (Caroline Valles, RN)

 Outcome:  Patient will have Vital Signs Within Expected Range (Caroline Valles, RN)

   Status:  Ongoing (Caroline Valles RN)

   

=================================================================

Fluid Volume

=================================================================

   

 State:  Risk For (Caroline Valles RN)

 Related To:  Gestational Hypertension (Caroline Valles RN)

 Goal(s):  Patient will Achieve and Maintain a Balanced Fluid Volume

   Status; Hemodynamically Stable (Caroline Valles RN)

 Interventions:  Monitor Vital Signs; Auscultate Breath Sounds; Monitor

   Patient for Skin Turgor, Mucous Membranes, Dry Skin, Weakness,

   Headaches and Confusion; Provide Oral Fluids as Ordered; Initiate

   and Maintain Intravenous Fluids as Ordered; Monitor Intake and

   Output as Indicated Per Patient Status; Accurately Measure Blood

   Loss; Monitor Lab and Test Results as Obtained and Notify Provider

   of Abnormal Findings; Monitor Patient's Weight (Caroline Valles RN)

 Outcome:  Patient will have Clear Lung Sounds (Caroline Valles RN)

   Status:  Ongoing (Caroline Vallse RN)

 Outcome:  Patient will have Vital Signs within Expected Range (Caroline Valles RN)

   Status:  Ongoing (Caroline Valles, RN)

 Outcome:  Urine Output will be within Expected Range (Caroline Valles, RN)

   Status:  Ongoing (Caroline Valles, RN)

 Outcome:  Patient will have Minimal Generalized or Upper Extremity

   Edema (Caroline Valles RN)

   Status:  Ongoing (Caroline Valles, RN)

   

=================================================================

Injury

=================================================================

   

 State:  Risk For (Caroline Valles RN)

 Related To:  Labor and Delivery Process (Caroline Valles RN)

 Goal(s):  Patient will Remain Free from Injury (Caroline Valles RN)

 Interventions:  Fetal Monitoring as per Hospital Protocol; Assess

   Neurological Status; Perform Risk Assessment of Patients with

   Induction and ; Perform Fall Risk Assessment and Prevention per

   Hospital Protocol; Perform DVT Risk Assessment and Prophylaxis per

   Hospital Protocol; Ensure that Oxygen, Suction, and Resuscitation

   Medications and Equipment are Readily Available; Confirm Patient ID

   Prior to Procedure(s) and Medication Administration per Hospital

   Policy (Caroline Valles RN)

 Outcome:  Successful Fall Risk Prevention (Caroline Valles RN)

   Status:  Ongoing (Caroline Valles, RN)

 Outcome:  Patient will Deliver Infant without Adverse Sequela (Caroline Valles RN)

   Status:  Ongoing (Caroline Valles RN)

 Outcome:  Patient's Neurological Status will Remain Stable (Caroline Valles RN)

   Status:  Ongoing (Caroline Valles RN)

   

=================================================================

Impaired Skin Integrity

=================================================================

   

 State:  Risk For (Caroline Valles RN)

 Related To:  Vaginal Delivery; Prolonged Bedrest; Invasive Procedures

   (Caroline Valles RN)

 Goal(s):  Patient will Maintain Optimal Skin Integrity, Free of

   Breakdown, Injury or Infection (Caroline Valles, RN)

 Interventions:  Complete Screening for Pressure Ulcer Risk and

   Initiate Protocol per Hospital Policy; Monitor Site of Skin

   Impairment for Color Changes, Redness, Swelling, Warmth, Pain or

   Other Signs of  Infection; Encourage and Assist with Position

   Changes; Monitor Patient's Mobility Status; Provide Adequate

   Nutrition and Fluids; Teach Patient Appropriate Hygienic Care; Teach

   Patient/Family Skin Care Management (Caroline Valles, RN)

 Outcome:  Patient will not have Evidence of Injury Such as Skin

   Breakdown, Scrapes, Cuts, or Bruising (Caroline Valles RN)

   Status:  Ongoing (Caroline Valles RN)

 Outcome:  Patient will Report Any Altered Sensation or Pain at Site of

   Skin Impairment (Caroline Valles, RN)

   Status:  Ongoing (Caroline Valles RN)

 Outcome:  Patients Incisions and Wounds will be without Signs or

   Symptoms of Infection (Caroline Valles RN)

   Status:  Ongoing (Caroline Valles RN)

 Outcome:  Patient will Demonstrate Understanding of Plan to Heal Skin

   and Prevent Reinjury and Verbalize Risk Factors (Caroline Valles RN)

   Status:  Ongoing (Caroline Valles RN)

   

=================================================================

Parenting Impaired

=================================================================

   

 State:  Not Applicable (Caroline Valles RN)

   

=================================================================

Nutrition

=================================================================

   

 State:  Risk For (Caroline Valles RN)

 Related To:  Pregnancy (Caroline Valles RN)

 Goal(s):  Patient will have an Intake of Nutrients Sufficient to Meet

   Metabolic Needs (Caroline Valles RN)

 Interventions:  Nutritional Screening and Assessment per Hospital

   Policy; Consult Dietician for Further Assessment and Recommendations

   Regarding Food Preferences and Nutritional Support; Allow Patient to

   Plan and Order Diet when Possible; Monitor Laboratory Values That

   Indicate Nutritional Well-being; Consult Lactation Specialist for

   Nutritional Support Regarding Breastfeeding Requirements; Document

   Actual Weight Initially and Weekly (Do Not Estimate); Encourage

   Patient Participation in Maintaining a Food Log as Indicated;

   Educate Patient on the Importance of Maintaining an Adequate Caloric

   Intake (Caroline Valles RN)

 Outcome:  Patient will Receive Adequate Calories and Fluid Volume to

   Meet Metabolic Needs (Caroline Valles RN)

   Status:  Ongoing (Caroline Valles, RN)

 Outcome:  Patient will Select Foods or Meals that Support Adequate

   Nutrition (Caroline Valles, RN)

   Status:  Ongoing (Caroline Valles, RN)

   

=================================================================

Grieving

=================================================================

   

 State:  Not Applicable (Caroline Valles, RN)

   

=================================================================

Additional Care Plan

=================================================================

   

 State:  Not Applicable (Caroline Valles RN)

## 2017-03-10 NOTE — PDOC DISCHARGE SUMMARY
Final Diagnosis


Discharge Date: 03/10/17





- Final Diagnosis


(1) Marijuana use


Is this a current diagnosis for this admission?: Yes





(2)  (normal spontaneous vaginal delivery)


Is this a current diagnosis for this admission?: Yes





(3) GDM (gestational diabetes mellitus)


Is this a current diagnosis for this admission?: Yes








Discharge Data





- Discharge Medication


Home Medications: 








Pnv No.122/Iron/Folic Acid [Prenatal Multi Tablet] 1 each PO DAILY #60 tablet 11

/15/16 


Docusate Sodium [Colace 100 mg Capsule] 100 mg PO BID #60 capsule 03/10/17 


Ferrous Sulfate [Feosol 325 mg Tablet] 325 mg PO BID #60 tablet 03/10/17 


Ibuprofen [Motrin 800 mg Tablet] 800 mg PO Q8HP PRN #90 tablet 03/10/17 








Reason(s) for Admission: Onset of Labor


Prenatal Procedures: Ultrasound


Intrapartum Procedure(s): Spontaneous Vaginal Delivery





- [place] Data


  ** Baby 1 Male


Apgar at 1 minute: 8


Apgar at 5 minutes: 9


Weight: 3062 kg


Home with Mother: No - NICU-opioid 


Complications: No





- Diagnosis Test


Laboratory: 


 











Temp Pulse Resp BP Pulse Ox


 


 97.8 F   73   20   120/72   100 


 


 03/10/17 09:10  03/10/17 09:10  03/10/17 09:10  03/10/17 09:10  03/10/17 09:10








 











  17





  15:20 15:37 07:20


 


RBC   3.40 L  3.36 L


 


Hgb   10.6 L  10.4 L


 


Hct   31.0 L  30.8 L


 


Urine Opiates Screen  UNCONFIRMED POSITIVE  














- Discharge information/Instructions


Discharge Activity: Activity As Tolerated, No Lifting Over 10 Pounds, No Lifting

/Push/Pulling, Pelvic Rest, Slowly Increase Activity, No tub bath


Discharge Diet: Regular


Disposition: HOME, SELF-CARE


Follow up with: [facility]


in: 4, Weeks - pp appt





Physical Exam (OB)


Vital Signs: 


 











Temp Pulse Resp BP Pulse Ox


 


 97.8 F   73   20   120/72   100 


 


 03/10/17 09:10  03/10/17 09:10  03/10/17 09:10  03/10/17 09:10  03/10/17 09:10








 Intake & Output











 03/09/17 03/10/17 03/11/17





 06:59 06:59 06:59


 


Intake Total  100 


 


Balance  100 


 


Weight 61.35 kg  














- General


General Appearance: Appears well


In distress: None





- PIH/Pre-Eclampsia


DTR's: 1 +


Clonus: Negative


Headache: Absent


Epigastric Pain: No


Visual Changes: No





- Episiotomy/Laceration


Site Condition: N/A





- Lochia


Lochia Amount: Scant < 10 ml


Lochia Color: Rubra/Red





- Abdomen


Description: Soft


Hernia Present: No


Fundal Description: Firm, Midline


Fundal Height: 1/u - 2/u





- Respiratory


Respiratory Status: No respiratory distress





- Extremities


Upper extremity: Normal inspection


Lower extremities: Normal inspection





- Neurological


Cognition: Normal


Orientation: AAOx4





- Psychological


Associated symptoms: Normal affect - baby and friends at bedside.
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
## 2019-12-12 ENCOUNTER — HOSPITAL ENCOUNTER (EMERGENCY)
Dept: HOSPITAL 62 - ER | Age: 24
Discharge: HOME | End: 2019-12-12
Payer: COMMERCIAL

## 2019-12-12 VITALS — DIASTOLIC BLOOD PRESSURE: 63 MMHG | SYSTOLIC BLOOD PRESSURE: 100 MMHG

## 2019-12-12 DIAGNOSIS — F17.200: ICD-10-CM

## 2019-12-12 DIAGNOSIS — M25.512: ICD-10-CM

## 2019-12-12 DIAGNOSIS — M62.830: Primary | ICD-10-CM
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
## 2019-12-12 PROCEDURE — 99283 EMERGENCY DEPT VISIT LOW MDM: CPT
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
## 2024-10-26 NOTE — L&D GENERAL ADMISSION
=================================================================

General Admit

=================================================================

Datetime Report Generated by CPN: 2017 18:00

   

   

=================================================================

PREGNANCY INFORMATION

=================================================================

   

Patient Age:  21    (2017 15:01:QS system process)

EDC:  2017 00:00    (2017 15:34:Mimi Jamil RN)

:  2    (2017 15:34:Marii Lenz RN)

Para:  1    (2017 15:34:Alba Gonzales RN)

Term:  1    (2017 15:34:Alba Gonzales RN)

:  0    (2017 15:34:Marii Lenz RN)

Spontaneous Abortions:  0    (2017 15:34:Marii Lenz RN)

Induced Abortions:  0    (2017 15:34:Marii Lenz RN)

Livin    (2017 15:34:THAD Ch)

Cesareans:  0    (2017 15:34:Abla Gonzales RN)

VBACs:  0    (2017 15:34:Alba Gonzales RN)

Ectopic:  0    (2017 15:34:Alba Gonzales RN)

Multiple Births:  0    (2017 15:34:Alba Gonzales RN)

Baby, Number in Womb:  1    (2017 15:34:THAD Ch)

   

=================================================================

PRENATAL CARE

=================================================================

   

Adequate Prenatal Care:  No    (2017 15:34:Caroline Valles RN)

Height (in):  60    (2017 15:13:QS system process)

   

=================================================================

ALLERGIES

=================================================================

   

Medication Allergy:  No    (2017 15:34:Marii Lenz RN)

Medication Allergies:  No Known Allergies (2017)    (2017

   15:11:QS system process)

Latex Allergy:  No Latex Allergies    (2017 15:34:Marii Lenz RN)

   

=================================================================

COMMUNICATION

=================================================================

   

Primary Language:  English    (2017 15:34:Mimi Jamil RN)

Medical Tx Preferred Language:  English    (2017 15:34:Mimi Jamil RN)

   

=================================================================

DEMOGRAPHICS

=================================================================

   

Address:  [address] [place] [address]   [id number]    (2017 15:01:QS system process)

Zipcode:  [address]    (2017 15:01:QS system process)

Home Telephone:  [phone]    (2017 15:01:QS system process)

N:      (2017 15:01:QS system process)

Next of Kin Name:  BHAVANA MORRIS    (2017 15:01:QS system

   process)

Next of Kin Phone:  [phone]    (2017 15:01:QS system

   process)

Next of Kin Relationship:  MO    (2017 15:01:QS system process)

YOB: 1995    (2017 15:01:QS system process)

Marital Status:  Single    (2017 15:01:QS system process)

Sex:  Female    (2017 15:01:QS system process)

Race:      (2017 15:01:QS system process)

Ethnicity:  Non- or     (2017 15:01:QS system

   process)

Jainism:  Yazidi    (2017 15:01:QS system process)

   

=================================================================

DRUG AND ALCOHOL USE

=================================================================

   

Alcohol:  No    (2017 15:34:THAD Ch)

Cigarettes:  Former Smoker. [id number]    (2017 15:34:THAD Ch)

Marijuana:  No    (2017 15:34:THAD Ch)

Cocaine:  No    (2017 15:34:THAD Ch)

Other Illicit Drugs:  No    (2017 15:34:THAD Ch)

   

=================================================================

VACCINE HISTORY

=================================================================

   

Influenza Vaccine:  Yes    (2017 15:34:THAD Ch)

Influenza Date:  3/3/17    (2017 15:34:THAD Ch)

Pneumococcal Vaccine:  No    (2017 15:34:THAD Ch)

Tetanus Vaccine:  Yes    (2017 15:34:THAD Ch)

Tetanus Date:  3/3/17    (2017 15:34:THAD Ch)

Tdap Vaccine:  Yes    (2017 15:34:THAD Ch)

Tdap Date:  3/3/17    (2017 15:34:THAD Ch)

Hepatitis B Vaccine:  Yes    (2017 15:34:THAD Ch)

   

=================================================================



=================================================================

   

Pediatrician:  [facility]    (2017 15:34:THAD Ch)

Feeding Preference:  Breast    (2017 15:34:THAD Ch)

Benefit of Breast Feed Discussed:  Yes    (2017 15:34:THAD Ch)

Circumcision:  No    (2017 15:34:THAD Ch)

Prenatal Classes Attended:  No    (2017 15:34:THAD Ch)

Tubal Ligation:  No    (2017 15:34:THAD Ch)

Tubal Authorization Signed:  N/A    (2017 15:34:THAD hC)

 Consent:  N/A    (2017 15:34:THAD Ch)

 Consent Signed:  N/A    (2017 15:34:THAD Ch)

Pain Management Plans:  Epidural    (2017 15:34:THAD Ch)

Support Person:  Guy    (2017 15:34:THAD Ch)

Support Person Relationship:  Significant Other    (2017

   15:34:THAD Ch)

Cultural/Spritual Practice:  No    (2017 15:34:THAD Ch)

Spir/Cult Dietary Needs:  No    (2017 15:34:THAD Ch)

   

=================================================================

LIVING SITUATION/DISCHARGE PLAN

=================================================================

   

Living Arrangements:  House    (2017 15:34:THAD Ch)

Adequate Access to::  Electric; Heat; Refrigeration; Plumbing/Running

   water; Phone; Transportation    (2017 15:34:THAD Ch)

WIC Program:  Yes    (2017 15:34:THAD Ch)

Discharge  Person:  Guy garza    (2017 15:34:THAD Ch)

Person to Help after Discharge:  Guy garza    (2017

   15:34:THAD Ch)

Currently Using Commun Resources:  Yes    (2017 15:34:THAD Ch)

Specify Current Resource Used:  Medicaid    (2017 15:34:THAD Ch)

Outside Agency/:  No    (2017 15:34:THAD Ch)

Car Seat for Discharge:  No    (2017 15:34:THAD Ch)

Adoption Requested:  No    (2017 15:34:THAD Ch)

Pt Contact w/infant Post Birth:  N/A    (2017 15:34:THAD Ch)

   

=================================================================

PRENATAL LABS

=================================================================

   

Blood Type:  O Positive    (2017 15:34:Caroline Valles RN)

Antibody Screen:  negative    (2017 15:34:Betina Newell RN)

Hemoglobin:  10.4 L    (2017 07:20:QS system process)

Hematocrit:  30.8 L    (2017 07:20:QS system process)

MCV:  92    (2017 07:20:QS system process)

Group Beta Strep:  Positive    (2017 15:34:Caroline Valles RN)

HIV Results:  NEGATIVE    (2017 15:37:QS system process)

Rubella:  Immune    (2017 15:37:Anaid Hylton RN)

Rubella Titer:  15.40    (2017 15:37:QS system process)

Varicella:  Non Susceptible    (2017 15:34:Betina Newell RN)

   

=================================================================

OB/PREVIOUS PREGNANCY HISTORY

=================================================================

   

History of Previous :  No    (2017 15:34:THAD Ch)

History of Gestational Diabetes:  No    (2017 15:34:THAD Ch)

History of PIH:  No    (2017 15:34:THAD Ch)

History of Incompetent Cervix:  No    (2017 15:34:THAD Ch)

History of Placenta Previa/Abrup:  No    (2017 15:34:THAD Ch)

History of Macrosomia:  No    (2017 15:34:THAD Ch)

History of IUGR:  No    (2017 15:34:THAD Ch)

History of Postpartum Hemorrhage:  No    (2017 15:34:THAD Ch)

History of Loss/Stillborn:  No    (2017 15:34:HTAD Ch)

History of  Death:  No    (2017 15:34:THAD Ch)

History of D (Rh) Sensitization:  No    (2017 15:34:THAD Ch)

History Recurrent Loss/Stillborn:  No    (2017 15:34:THAD Ch)

History Depression/PP Depression:  No    (2017 15:34:THAD Ch)

History of  Uterine Anomaly/RUPINDER:  No    (2017 15:34:THAD Ch)

History of Infertility:  No    (2017 15:34:THAD Ch)

History of  ART Treatment:  No    (2017 15:34:THAD Ch)

History of RUPINDER:  No    (2017 15:34:THAD Ch)

Comments Obstetrical History:  G1:2015, 5lb 6 ozs

   G2: current, no PNCz    (2017 15:34:THAD Ch)

   

=================================================================

MEDICAL HISTORY

=================================================================

   

Med Hx Diabetes:  No    (2017 15:34:THAD Ch)

Med Hx Hypertension:  No    (2017 15:34:THAD Ch)

Med Hx Heart Disease:  No    (2017 15:34:THAD Ch)

Med Hx Autoimmune Disorder:  No    (2017 15:34:THAD Ch)

Med Hx Kidney Disease/UTI:  No    (2017 15:34:THAD Ch)

Med Hx Neurologic/Epilepsy:  No    (2017 15:34:THAD Ch)

Med Hx Psychiatric Disorders:  No    (2017 15:34:THAD Ch)

Med Hx Hepatitis/Liver Disease:  No    (2017 15:34:THAD Ch)

Med Hx Varicosities/Phlebitis:  No    (2017 15:34:THAD Ch)

Med Hx Thyroid Dysfunction:  No    (2017 15:34:THAD Ch)

Med Hx Trauma/Violence:  No    (2017 15:34:THAD Ch)

Med Hx Blood Transfusion:  No    (2017 15:34:THAD Ch)

Med Hx Pulmonary (Asthma,TB):  No    (2017 15:34:THAD Ch)

Med Hx Breast:  No    (2017 15:34:THAD Ch)

Med Hx GYN Surgery:  No    (2017 15:34:THAD Ch)

Med Hx Hospitalization/Surgery:  No    (2017 15:34:THAD Ch)

Med Hx Anesthetic Complications:  No    (2017 15:34:THAD Ch)

Med Hx Abnormal Pap Smear:  No    (2017 15:34:THAD Ch)

Other Medical Diseases:  No    (2017 15:34:THAD Ch)

Med Hx Significant Family Hx:  No    (2017 15:34:THAD Ch)

   

=================================================================

INFECTIOUS HISTORY

=================================================================

   

Inf Hx Gonorrhea:  No    (2017 15:34:THAD Ch)

Inf Hx Chlamydia:  No    (2017 15:34:THAD Ch)

Inf Hx Syphilis:  No    (2017 15:34:THAD Ch)

Inf Hx HIV/AIDS:  No    (2017 15:34:THAD Ch)

Inf Hx Human Papilloma Virus:  No    (2017 15:34:THAD Ch)

Inf Hx Pt/Partner Genital Herpes:  No    (2017 15:34:THAD Ch)

Inf Hx Tuberculosis/Exposure:  No    (2017 15:34:THAD Ch)

Inf Hx Hepatitis B,C:  No    (2017 15:34:THAD Ch)

Inf Hx Rash or Viral Illness:  No    (2017 15:34:THAD Ch)

   

=================================================================

GENETIC HISTORY

=================================================================

   

Gen Hx Age >=35 at LOLI:  No    (2017 15:34:THAD Ch)

Gen Hx Thalassemia:  No    (2017 15:34:THAD Ch)

Gen Hx Congenital Heart Defect:  No    (2017 15:34:THAD Ch)

Gen Hx Neural Tube Defect:  No    (2017 15:34:THAD Ch)

Gen Hx Down's Syndrome:  No    (2017 15:34:TAHD Ch)

Gen Hx Akhil-Sachs:  No    (2017 15:34:THAD Ch)

Gen Hx [place]:  No    (2017 15:34:THAD Ch)

Gen Hx Familial Dysautonomia:  No    (2017 15:34:THAD Ch)

Gen Hx Sickle Cell Disease/Trait:  No    (2017 15:34:THAD Ch)

Gen Hx Hemophilia/Blood Disorder:  No    (2017 15:34:THAD Ch)

Gen Hx Muscular Dystrophy:  No    (2017 15:34:THAD Ch)

Gen Hx Cystic Fibrosis:  No    (2017 15:34:THAD Ch)

Gen Hx Huntingtons Chorea:  No    (2017 15:34:THAD Ch)

Gen Hx Mental Retardation/Autism:  No    (2017 15:34:THAD Ch)

Gen Hx Tested for Fragile X:  No    (2017 15:34:THAD Ch)

Gen Hx Other Inher/Chromosomal:  No    (2017 15:34:THAD Ch)

Gen Hx Maternal Metabolic DO:  No    (2017 15:34:THAD Ch)

Gen Hx Pt Father or FOB Defect:  No    (2017 15:34:THAD Ch)

Gen Hx Other Genetic History:  No    (2017 15:34:THAD Ch)

Gen Hx Drugs/Meds since LMP:  No    (2017 15:34:THAD Ch) 0 = understands/communicates without difficulty